# Patient Record
Sex: FEMALE | Race: WHITE | Employment: OTHER | URBAN - METROPOLITAN AREA
[De-identification: names, ages, dates, MRNs, and addresses within clinical notes are randomized per-mention and may not be internally consistent; named-entity substitution may affect disease eponyms.]

---

## 2017-04-17 ENCOUNTER — OFFICE VISIT (OUTPATIENT)
Dept: DERMATOLOGY CLINIC | Facility: CLINIC | Age: 62
End: 2017-04-17

## 2017-04-17 DIAGNOSIS — L71.9 ROSACEA: Primary | ICD-10-CM

## 2017-04-17 DIAGNOSIS — L82.1 SEBORRHEIC KERATOSES: ICD-10-CM

## 2017-04-17 DIAGNOSIS — D23.4 BENIGN NEOPLASM OF SCALP AND SKIN OF NECK: ICD-10-CM

## 2017-04-17 DIAGNOSIS — D23.30 BENIGN NEOPLASM OF SKIN OF FACE: ICD-10-CM

## 2017-04-17 DIAGNOSIS — D23.70 BENIGN NEOPLASM OF SKIN OF LOWER LIMB, INCLUDING HIP, UNSPECIFIED LATERALITY: ICD-10-CM

## 2017-04-17 DIAGNOSIS — D23.60 BENIGN NEOPLASM OF SKIN OF UPPER LIMB, INCLUDING SHOULDER, UNSPECIFIED LATERALITY: ICD-10-CM

## 2017-04-17 DIAGNOSIS — D23.5 BENIGN NEOPLASM OF SKIN OF TRUNK, EXCEPT SCROTUM: ICD-10-CM

## 2017-04-17 PROCEDURE — 99212 OFFICE O/P EST SF 10 MIN: CPT | Performed by: DERMATOLOGY

## 2017-04-17 PROCEDURE — 99213 OFFICE O/P EST LOW 20 MIN: CPT | Performed by: DERMATOLOGY

## 2017-04-17 RX ORDER — DOXYCYCLINE HYCLATE 100 MG
100 TABLET ORAL DAILY
Qty: 90 TABLET | Refills: 3 | Status: SHIPPED | OUTPATIENT
Start: 2017-04-17 | End: 2020-09-02 | Stop reason: ALTCHOICE

## 2017-04-17 RX ORDER — DOXYCYCLINE HYCLATE 100 MG
100 TABLET ORAL DAILY
Qty: 90 TABLET | Refills: 3 | Status: SHIPPED | OUTPATIENT
Start: 2017-04-17 | End: 2017-04-17

## 2017-04-17 NOTE — PROGRESS NOTES
Rx for Doxycycline 100mg faxed with conformation to [facility]) Drugs per pt request.  Fax #: [phone]   #: [id number]

## 2017-05-01 NOTE — PROGRESS NOTES
Jeremy Díaz is a 64year old female. HPI:     CC:  Patient presents with:  Full Skin Exam: LOV 10/26/2015. Pt presenting for full body skin exam. Pt denies personal and family hx of skin cancer. Rosacea: Pt presenting for f/u with rosacea.  Currently (100MG)  by oral route daily Disp: 90 tablet Rfl: 3   Calcium Carbonate-Vitamin D (CALCIUM 600 + D OR) Take 2 tablets by mouth daily. Disp:  Rfl:    Risedronate Sodium (ACTONEL) 150 MG Oral Tab Take 1 tablet (150 mg total) by mouth every 30 (thirty) days. Smoking status: Never Smoker     Smokeless tobacco: Never Used    Alcohol Use: Yes  0.0 oz/week    0 Standard drinks or equivalent per week         Comment: 3-4 occasionally    Drug Use: No    Sexual Activity: No   No  Other Topics Concern    History of ta pigmented lesions examined with dermoscopy benign-appearing patterns. Waxy tannish keratotic papules scattered, cherry-red vascular papules scattered. See map today's date for lesions noted . Otherwise remarkable for lesions as noted on map.   Kaitlynn Murphy

## 2017-05-09 ENCOUNTER — TELEPHONE (OUTPATIENT)
Dept: OBGYN CLINIC | Facility: CLINIC | Age: 62
End: 2017-05-09

## 2017-05-09 NOTE — TELEPHONE ENCOUNTER
Has annual exam for 6-19 w MAF needs refill  Please fax to pharmacy FAX : 1 [phone] Liliya Gómez     Please call pt once faxed over / lv vm on pts vm   Current outpatient prescriptions:     •  Risedronate Sodium (ACTONEL) 150 MG Oral T

## 2017-05-11 NOTE — TELEPHONE ENCOUNTER
Refill request received from [facility] ([facility] via fax. Will await MAFs response to message below before faxing form back. Form placed back in black bin until MAF responds.

## 2017-05-11 NOTE — TELEPHONE ENCOUNTER
Form completed and faxed back to [facility] for 1 tablet with no refills. (Rx states pt takes 1 pill every 30 days).

## 2017-06-19 ENCOUNTER — OFFICE VISIT (OUTPATIENT)
Dept: OBGYN CLINIC | Facility: CLINIC | Age: 62
End: 2017-06-19

## 2017-06-19 VITALS
DIASTOLIC BLOOD PRESSURE: 70 MMHG | BODY MASS INDEX: 30.16 KG/M2 | HEIGHT: 63.25 IN | WEIGHT: 172.38 LBS | HEART RATE: 58 BPM | SYSTOLIC BLOOD PRESSURE: 102 MMHG

## 2017-06-19 DIAGNOSIS — Z12.31 ENCOUNTER FOR SCREENING MAMMOGRAM FOR MALIGNANT NEOPLASM OF BREAST: ICD-10-CM

## 2017-06-19 DIAGNOSIS — Z13.820 OSTEOPOROSIS SCREENING: Primary | ICD-10-CM

## 2017-06-19 PROCEDURE — 99396 PREV VISIT EST AGE 40-64: CPT | Performed by: CLINICAL NURSE SPECIALIST

## 2017-06-19 NOTE — PROGRESS NOTES
Angel Hardin is a 64year old female  No LMP recorded. Patient has had a hysterectomy. Patient presents with:  Gyn Exam: Annual exam and rx refill  Last annual exam was 16. Last pap was 5/11/15 and was normal, HPV negative.  Denies hx of abno Pt has a pacemaker No    Pt has a defibrillator No    Reaction to local anesthetic No    Caffeine Concern Yes    Comment: 2 cups coffee daily     Social History Narrative       FAMILY HISTORY:  Family History   Problem Relation Age of Onset   • Lipids M constipation  Genitourinary:  denies dysuria, incontinence, abnormal vaginal discharge, vaginal itching  Musculoskeletal:  denies back pain. Skin/Breast:  Denies any breast pain, lumps, or discharge.    Neurological:  denies headaches, extremity weakness o needs to continue on Actonel, will need paper Rx for 1 full year as she pays out of pocket for it and uses a [address] Call if any vaginal bleeding. Encouraged 1500 mg calcium w/ vit D. Encouraged weight bearing exercise.     Follow-up in o

## 2017-07-20 ENCOUNTER — HOSPITAL ENCOUNTER (OUTPATIENT)
Dept: BONE DENSITY | Facility: HOSPITAL | Age: 62
Discharge: HOME | End: 2017-07-20
Attending: CLINICAL NURSE SPECIALIST
Payer: COMMERCIAL

## 2017-07-20 DIAGNOSIS — Z13.820 OSTEOPOROSIS SCREENING: ICD-10-CM

## 2017-07-20 PROCEDURE — 77080 DXA BONE DENSITY AXIAL: CPT | Performed by: CLINICAL NURSE SPECIALIST

## 2017-07-27 ENCOUNTER — TELEPHONE (OUTPATIENT)
Dept: OBGYN CLINIC | Facility: CLINIC | Age: 62
End: 2017-07-27

## 2017-07-27 NOTE — TELEPHONE ENCOUNTER
----- Message from PHI Messer sent at 7/27/2017  4:30 PM CDT -----  Please let pt know bone density has improved since 2015, which is great. Id recommend she stay on Actonel and we will repeat the scan in about 3 years.     MAF

## 2017-07-27 NOTE — TELEPHONE ENCOUNTER
Pt notified of results and recs below. Pt states she has one dose left of Actonel that she will take next week and requesting rx refill. Pt informed a message will be forwarded to [facility] to ask for the refill.  Pt requesting that the rx is mailed to her home a

## 2017-07-28 RX ORDER — RISEDRONATE SODIUM 150 MG/1
150 TABLET, FILM COATED ORAL
Qty: 1 TABLET | Refills: 12 | Status: SHIPPED | OUTPATIENT
Start: 2017-07-28 | End: 2018-07-02

## 2017-07-28 NOTE — TELEPHONE ENCOUNTER
This is fine to do-please mail pt her Rx. I just saw her for her annual on 6/19, so she can have a years worth.        MAF

## 2017-09-07 ENCOUNTER — HOSPITAL ENCOUNTER (OUTPATIENT)
Dept: MAMMOGRAPHY | Facility: HOSPITAL | Age: 62
Discharge: HOME | End: 2017-09-07
Attending: CLINICAL NURSE SPECIALIST
Payer: COMMERCIAL

## 2017-09-07 DIAGNOSIS — Z12.31 ENCOUNTER FOR SCREENING MAMMOGRAM FOR MALIGNANT NEOPLASM OF BREAST: ICD-10-CM

## 2017-09-07 PROCEDURE — 77067 SCR MAMMO BI INCL CAD: CPT | Performed by: CLINICAL NURSE SPECIALIST

## 2018-07-02 ENCOUNTER — OFFICE VISIT (OUTPATIENT)
Dept: OBGYN CLINIC | Facility: CLINIC | Age: 63
End: 2018-07-02

## 2018-07-02 ENCOUNTER — TELEPHONE (OUTPATIENT)
Dept: OBGYN CLINIC | Facility: CLINIC | Age: 63
End: 2018-07-02

## 2018-07-02 ENCOUNTER — APPOINTMENT (OUTPATIENT)
Dept: LAB | Facility: HOSPITAL | Age: 63
End: 2018-07-02
Attending: CLINICAL NURSE SPECIALIST
Payer: COMMERCIAL

## 2018-07-02 VITALS
WEIGHT: 165.38 LBS | BODY MASS INDEX: 29.67 KG/M2 | HEIGHT: 62.75 IN | HEART RATE: 67 BPM | DIASTOLIC BLOOD PRESSURE: 69 MMHG | SYSTOLIC BLOOD PRESSURE: 103 MMHG

## 2018-07-02 DIAGNOSIS — R30.0 DYSURIA: ICD-10-CM

## 2018-07-02 DIAGNOSIS — N89.8 VAGINAL IRRITATION: ICD-10-CM

## 2018-07-02 DIAGNOSIS — Z12.31 ENCOUNTER FOR SCREENING MAMMOGRAM FOR MALIGNANT NEOPLASM OF BREAST: ICD-10-CM

## 2018-07-02 DIAGNOSIS — Z01.419 WELL WOMAN EXAM WITH ROUTINE GYNECOLOGICAL EXAM: Primary | ICD-10-CM

## 2018-07-02 LAB
BILIRUB UR QL: NEGATIVE
COLOR UR: YELLOW
GLUCOSE UR-MCNC: NEGATIVE MG/DL
HGB UR QL STRIP.AUTO: NEGATIVE
KETONES UR-MCNC: NEGATIVE MG/DL
NITRITE UR QL STRIP.AUTO: NEGATIVE
PH UR: 7 [PH] (ref 5–8)
PROT UR-MCNC: NEGATIVE MG/DL
RBC #/AREA URNS AUTO: 1 /HPF
SP GR UR STRIP: 1.02 (ref 1–1.03)
UROBILINOGEN UR STRIP-ACNC: <2
VIT C UR-MCNC: NEGATIVE MG/DL
WBC #/AREA URNS AUTO: 18 /HPF

## 2018-07-02 PROCEDURE — 87086 URINE CULTURE/COLONY COUNT: CPT

## 2018-07-02 PROCEDURE — 99396 PREV VISIT EST AGE 40-64: CPT | Performed by: CLINICAL NURSE SPECIALIST

## 2018-07-02 PROCEDURE — 87088 URINE BACTERIA CULTURE: CPT

## 2018-07-02 PROCEDURE — 81001 URINALYSIS AUTO W/SCOPE: CPT

## 2018-07-02 PROCEDURE — 87186 SC STD MICRODIL/AGAR DIL: CPT

## 2018-07-02 RX ORDER — CHLORAL HYDRATE 500 MG
1000 CAPSULE ORAL DAILY
COMMUNITY
End: 2018-08-20 | Stop reason: CLARIF

## 2018-07-02 RX ORDER — RISEDRONATE SODIUM 150 MG/1
150 TABLET, FILM COATED ORAL
Qty: 1 TABLET | Refills: 12 | Status: SHIPPED | OUTPATIENT
Start: 2018-07-02 | End: 2020-09-02 | Stop reason: ALTCHOICE

## 2018-07-02 RX ORDER — CIPROFLOXACIN 500 MG/1
TABLET, FILM COATED ORAL
COMMUNITY
Start: 2018-04-26 | End: 2018-07-02

## 2018-07-02 RX ORDER — NITROFURANTOIN 25; 75 MG/1; MG/1
100 CAPSULE ORAL 2 TIMES DAILY
Qty: 14 CAPSULE | Refills: 0 | Status: SHIPPED | OUTPATIENT
Start: 2018-07-02 | End: 2018-07-03

## 2018-07-02 RX ORDER — ATOVAQUONE AND PROGUANIL HYDROCHLORIDE 250; 100 MG/1; MG/1
1 TABLET, FILM COATED ORAL
COMMUNITY
Start: 2016-08-25 | End: 2018-07-02

## 2018-07-03 RX ORDER — NITROFURANTOIN 25; 75 MG/1; MG/1
100 CAPSULE ORAL 2 TIMES DAILY
Qty: 14 CAPSULE | Refills: 0 | Status: SHIPPED | OUTPATIENT
Start: 2018-07-03 | End: 2018-07-10

## 2018-07-03 NOTE — TELEPHONE ENCOUNTER
----- Message from PHI Griffin sent at 7/2/2018  7:00 PM CDT -----  Please let pt know it does debi like she may have a UTI. Rx for macrobid 1 tab po BID x 7 days sent to pharmacy.      MAF

## 2018-07-03 NOTE — TELEPHONE ENCOUNTER
Pt informed of below. She asked that we sent rx to a different pharmacy. Pharmacy updated. Pt verbalized understanding.

## 2018-07-03 NOTE — PROGRESS NOTES
Alfonso Carmichael is a 58year old female  No LMP recorded. Patient has had a hysterectomy. Patient presents with:  Gyn Exam: Annual  Last annual exam was 17. Last pap was  and normal, HPV negative. No further pap's needed d/t hysterectomy.  D N/A     Occupational History  Dentist       Social History Main Topics   Smoking status: Never Smoker    Smokeless tobacco: Never Used    Alcohol use Yes  0.0 oz/week     Comment: social    Drug use: No    Sexual activity: No     Other Topics Concern    Hi Glycol-Propyl Glycol (SYSTANE) 0.4-0.3 % Ophthalmic Solution, Apply  to eye. Use one drop each eye PRN. , Disp: , Rfl:   •  vitamin E (E-400) 400 UNITS Oral Cap, Take  by mouth. vitamin E 400 unit capsule, Disp: , Rfl:     ALLERGIES:    Vicodin  [Hydrocodo* normal  Anus: no hemorroids     Assessment & Plan:  Dani Holt was seen today for gyn exam.    Diagnoses and all orders for this visit:    Well woman exam with routine gynecological exam    Vaginal irritation  -     GENITAL VAGINOSIS SCREEN; Future  -     GEN

## 2018-07-04 LAB
GENITAL VAGINOSIS SCREEN: NEGATIVE
TRICHOMONAS SCREEN: NEGATIVE

## 2018-07-10 ENCOUNTER — TELEPHONE (OUTPATIENT)
Dept: OBGYN CLINIC | Facility: CLINIC | Age: 63
End: 2018-07-10

## 2018-07-10 NOTE — TELEPHONE ENCOUNTER
----- Message from PHI Fried sent at 7/9/2018  8:12 AM CDT -----  Please let pt know vaginal culture is negative for infection.     MAF

## 2018-07-16 ENCOUNTER — PATIENT MESSAGE (OUTPATIENT)
Dept: OBGYN CLINIC | Facility: CLINIC | Age: 63
End: 2018-07-16

## 2018-07-16 DIAGNOSIS — R30.9 PAIN WITH URINATION: Primary | ICD-10-CM

## 2018-07-16 DIAGNOSIS — Z87.440 RECENT URINARY TRACT INFECTION: ICD-10-CM

## 2018-07-16 NOTE — TELEPHONE ENCOUNTER
From: Louis Brown  To: PHI Rodríguez  Sent: 7/16/2018 11:44 AM CDT  Subject: Visit Follow-up Question    Hi Vernell  I took antibiotics for UTI as directed w last dose 6 days ago.  My symptoms have improved but I am still having discomfort when ur

## 2018-07-19 ENCOUNTER — APPOINTMENT (OUTPATIENT)
Dept: LAB | Facility: HOSPITAL | Age: 63
End: 2018-07-19
Attending: CLINICAL NURSE SPECIALIST
Payer: COMMERCIAL

## 2018-07-19 DIAGNOSIS — R30.9 PAIN WITH URINATION: ICD-10-CM

## 2018-07-19 DIAGNOSIS — Z87.440 RECENT URINARY TRACT INFECTION: ICD-10-CM

## 2018-07-19 PROCEDURE — 87086 URINE CULTURE/COLONY COUNT: CPT

## 2018-07-23 ENCOUNTER — TELEPHONE (OUTPATIENT)
Dept: OBGYN CLINIC | Facility: CLINIC | Age: 63
End: 2018-07-23

## 2018-07-23 DIAGNOSIS — R30.0 BURNING WITH URINATION: Primary | ICD-10-CM

## 2018-07-23 NOTE — TELEPHONE ENCOUNTER
----- Message from PHI Fried sent at 7/22/2018  1:42 PM CDT -----  Please let pt know that urine culture shows some organisms that are c/w contamination. If she is still symptomatic, she should repeat the urine culture.     MAF

## 2018-07-23 NOTE — TELEPHONE ENCOUNTER
PT NOTIFIED OF RESULTS. SHE SAW THIS RESULT ON MY CHART ALREADY. STATES SHE IS STILL SYMPTOMATIC, SOME BURNING WITH URINATION STILL. PT AWARE ORDER FOR REPEAT URINE CULTURE PLACED AND WILL COME FOR THE TEST THIS WEEK.

## 2018-07-24 ENCOUNTER — LAB ENCOUNTER (OUTPATIENT)
Dept: LAB | Facility: HOSPITAL | Age: 63
End: 2018-07-24
Attending: CLINICAL NURSE SPECIALIST
Payer: COMMERCIAL

## 2018-07-24 DIAGNOSIS — R30.0 BURNING WITH URINATION: ICD-10-CM

## 2018-07-26 ENCOUNTER — APPOINTMENT (OUTPATIENT)
Dept: LAB | Facility: HOSPITAL | Age: 63
End: 2018-07-26
Attending: CLINICAL NURSE SPECIALIST
Payer: COMMERCIAL

## 2018-07-26 DIAGNOSIS — R30.0 BURNING WITH URINATION: ICD-10-CM

## 2018-07-26 LAB
BACTERIA UR QL AUTO: NEGATIVE /HPF
BILIRUB UR QL: NEGATIVE
CLARITY UR: CLEAR
COLOR UR: YELLOW
GLUCOSE UR-MCNC: NEGATIVE MG/DL
HGB UR QL STRIP.AUTO: NEGATIVE
KETONES UR-MCNC: NEGATIVE MG/DL
NITRITE UR QL STRIP.AUTO: NEGATIVE
PH UR: 7 [PH] (ref 5–8)
PROT UR-MCNC: NEGATIVE MG/DL
RBC #/AREA URNS AUTO: <1 /HPF
SP GR UR STRIP: 1.01 (ref 1–1.03)
UROBILINOGEN UR STRIP-ACNC: <2
VIT C UR-MCNC: NEGATIVE MG/DL
WBC #/AREA URNS AUTO: 1 /HPF

## 2018-07-26 PROCEDURE — 81001 URINALYSIS AUTO W/SCOPE: CPT

## 2018-07-30 ENCOUNTER — TELEPHONE (OUTPATIENT)
Dept: OBGYN CLINIC | Facility: CLINIC | Age: 63
End: 2018-07-30

## 2018-07-30 NOTE — TELEPHONE ENCOUNTER
----- Message from PHI Upton sent at 7/30/2018 12:46 PM CDT -----  Please let pt know UA looks ok. I had asked for a urine culture to be done but it looks like only a UA was ordered.  Id recommend that if she is still symptomatic, she follow up wit

## 2018-08-20 ENCOUNTER — LAB ENCOUNTER (OUTPATIENT)
Dept: LAB | Facility: HOSPITAL | Age: 63
End: 2018-08-20
Attending: INTERNAL MEDICINE
Payer: COMMERCIAL

## 2018-08-20 ENCOUNTER — OFFICE VISIT (OUTPATIENT)
Dept: INTERNAL MEDICINE CLINIC | Facility: CLINIC | Age: 63
End: 2018-08-20
Payer: COMMERCIAL

## 2018-08-20 VITALS
SYSTOLIC BLOOD PRESSURE: 110 MMHG | HEART RATE: 54 BPM | BODY MASS INDEX: 28.05 KG/M2 | HEIGHT: 63.5 IN | RESPIRATION RATE: 18 BRPM | WEIGHT: 160.31 LBS | DIASTOLIC BLOOD PRESSURE: 75 MMHG

## 2018-08-20 DIAGNOSIS — M85.80 OSTEOPENIA, UNSPECIFIED LOCATION: ICD-10-CM

## 2018-08-20 DIAGNOSIS — R30.9 PAINFUL URINATION: ICD-10-CM

## 2018-08-20 DIAGNOSIS — Z00.00 ROUTINE PHYSICAL EXAMINATION: Primary | ICD-10-CM

## 2018-08-20 DIAGNOSIS — Z00.00 ROUTINE PHYSICAL EXAMINATION: ICD-10-CM

## 2018-08-20 DIAGNOSIS — Z23 NEED FOR VACCINATION: ICD-10-CM

## 2018-08-20 LAB
ALBUMIN SERPL BCP-MCNC: 4.1 G/DL (ref 3.5–4.8)
ALBUMIN/GLOB SERPL: 1.5 {RATIO} (ref 1–2)
ALP SERPL-CCNC: 58 U/L (ref 32–100)
ALT SERPL-CCNC: 26 U/L (ref 14–54)
ANION GAP SERPL CALC-SCNC: 5 MMOL/L (ref 0–18)
APPEARANCE: CLEAR
AST SERPL-CCNC: 29 U/L (ref 15–41)
BASOPHILS # BLD: 0.1 K/UL (ref 0–0.2)
BASOPHILS NFR BLD: 2 %
BILIRUB SERPL-MCNC: 0.8 MG/DL (ref 0.3–1.2)
BILIRUBIN: NEGATIVE
BUN SERPL-MCNC: 12 MG/DL (ref 8–20)
BUN/CREAT SERPL: 15 (ref 10–20)
CALCIUM SERPL-MCNC: 9.4 MG/DL (ref 8.5–10.5)
CHLORIDE SERPL-SCNC: 105 MMOL/L (ref 95–110)
CHOLEST SERPL-MCNC: 199 MG/DL (ref 110–200)
CO2 SERPL-SCNC: 28 MMOL/L (ref 22–32)
CREAT SERPL-MCNC: 0.8 MG/DL (ref 0.5–1.5)
EOSINOPHIL # BLD: 0.1 K/UL (ref 0–0.7)
EOSINOPHIL NFR BLD: 2 %
ERYTHROCYTE [DISTWIDTH] IN BLOOD BY AUTOMATED COUNT: 13.1 % (ref 11–15)
GLOBULIN PLAS-MCNC: 2.7 G/DL (ref 2.5–3.7)
GLUCOSE (URINE DIPSTICK): NEGATIVE MG/DL
GLUCOSE SERPL-MCNC: 98 MG/DL (ref 70–99)
HCT VFR BLD AUTO: 42.4 % (ref 35–48)
HDLC SERPL-MCNC: 58 MG/DL
HGB BLD-MCNC: 14.3 G/DL (ref 12–16)
KETONES (URINE DIPSTICK): NEGATIVE MG/DL
LDLC SERPL CALC-MCNC: 121 MG/DL (ref 0–99)
LYMPHOCYTES # BLD: 0.9 K/UL (ref 1–4)
LYMPHOCYTES NFR BLD: 23 %
MCH RBC QN AUTO: 34.5 PG (ref 27–32)
MCHC RBC AUTO-ENTMCNC: 33.8 G/DL (ref 32–37)
MCV RBC AUTO: 102 FL (ref 80–100)
MONOCYTES # BLD: 0.3 K/UL (ref 0–1)
MONOCYTES NFR BLD: 8 %
MULTISTIX LOT#: ABNORMAL NUMERIC
NEUTROPHILS # BLD AUTO: 2.7 K/UL (ref 1.8–7.7)
NEUTROPHILS NFR BLD: 66 %
NITRITE, URINE: NEGATIVE
NONHDLC SERPL-MCNC: 141 MG/DL
OCCULT BLOOD: NEGATIVE
OSMOLALITY UR CALC.SUM OF ELEC: 286 MOSM/KG (ref 275–295)
PATIENT FASTING: YES
PH, URINE: 8 (ref 4.5–8)
PLATELET # BLD AUTO: 232 K/UL (ref 140–400)
PMV BLD AUTO: 8.9 FL (ref 7.4–10.3)
POTASSIUM SERPL-SCNC: 4.7 MMOL/L (ref 3.3–5.1)
PROT SERPL-MCNC: 6.8 G/DL (ref 5.9–8.4)
PROTEIN (URINE DIPSTICK): NEGATIVE MG/DL
RBC # BLD AUTO: 4.16 M/UL (ref 3.7–5.4)
SODIUM SERPL-SCNC: 138 MMOL/L (ref 136–144)
SPECIFIC GRAVITY: 1.01 (ref 1–1.03)
TRIGL SERPL-MCNC: 98 MG/DL (ref 1–149)
TSH SERPL-ACNC: 1.68 UIU/ML (ref 0.45–5.33)
URINE-COLOR: YELLOW
UROBILINOGEN,SEMI-QN: 0.2 MG/DL (ref 0–1.9)
VIT B12 SERPL-MCNC: 337 PG/ML (ref 181–914)
WBC # BLD AUTO: 4.2 K/UL (ref 4–11)

## 2018-08-20 PROCEDURE — 90715 TDAP VACCINE 7 YRS/> IM: CPT | Performed by: INTERNAL MEDICINE

## 2018-08-20 PROCEDURE — 99396 PREV VISIT EST AGE 40-64: CPT | Performed by: INTERNAL MEDICINE

## 2018-08-20 PROCEDURE — 80053 COMPREHEN METABOLIC PANEL: CPT

## 2018-08-20 PROCEDURE — 82607 VITAMIN B-12: CPT

## 2018-08-20 PROCEDURE — 80061 LIPID PANEL: CPT

## 2018-08-20 PROCEDURE — 84443 ASSAY THYROID STIM HORMONE: CPT

## 2018-08-20 PROCEDURE — 81002 URINALYSIS NONAUTO W/O SCOPE: CPT | Performed by: INTERNAL MEDICINE

## 2018-08-20 PROCEDURE — 90471 IMMUNIZATION ADMIN: CPT | Performed by: INTERNAL MEDICINE

## 2018-08-20 PROCEDURE — 36415 COLL VENOUS BLD VENIPUNCTURE: CPT

## 2018-08-20 PROCEDURE — 85025 COMPLETE CBC W/AUTO DIFF WBC: CPT

## 2018-08-27 ENCOUNTER — OFFICE VISIT (OUTPATIENT)
Dept: OPTOMETRY | Facility: CLINIC | Age: 63
End: 2018-08-27
Payer: COMMERCIAL

## 2018-08-27 DIAGNOSIS — H52.13 MYOPIA, BILATERAL: ICD-10-CM

## 2018-08-27 DIAGNOSIS — H25.13 AGE-RELATED NUCLEAR CATARACT OF BOTH EYES: Primary | ICD-10-CM

## 2018-08-27 PROCEDURE — 92012 INTRM OPH EXAM EST PATIENT: CPT | Performed by: OPTOMETRIST

## 2018-08-27 PROCEDURE — 92015 DETERMINE REFRACTIVE STATE: CPT | Performed by: OPTOMETRIST

## 2018-08-27 NOTE — PATIENT INSTRUCTIONS
Age-related nuclear cataract of both eyes  No treatment is required. Will continue to observe. Myopia, bilateral  New glasses RX given to update as needed. Patient wants to get a new pair of glasses to wear with her dental loops.

## 2018-08-27 NOTE — ASSESSMENT & PLAN NOTE
New glasses RX given to update as needed. Patient wants to get a new pair of glasses to wear with her dental loops.

## 2018-08-27 NOTE — PROGRESS NOTES
Fiorella Mendiola is a 58year old female. HPI:     HPI     Patient is in for an annual eye exam. She has no complaints  with her vision and saw her ophthalmologist --Rashard Clancy -- last year. She had a RCE in the past but no symptoms lately.   (Patient i social      Medications:    Current Outpatient Prescriptions:  Risedronate Sodium 150 MG Oral Tab Take 1 tablet (150 mg total) by mouth every 30 (thirty) days.  Disp: 1 tablet Rfl: 12   Doxycycline Hyclate 100 MG Oral Tab Take 1 tablet (100 mg total) by tran Lids/Lashes Normal punctal plug on LLL    Conjunctiva/Sclera Nasal/temp pinguecula Nasal/temp pinguecula    Cornea Clear Clear    Anterior Chamber Deep and quiet Deep and quiet    Iris Normal Normal    Lens Trace Nuclear sclerosis Trace Nuclear sclerosis

## 2018-09-20 ENCOUNTER — OFFICE VISIT (OUTPATIENT)
Dept: SURGERY | Facility: CLINIC | Age: 63
End: 2018-09-20
Payer: COMMERCIAL

## 2018-09-20 VITALS
HEIGHT: 63 IN | DIASTOLIC BLOOD PRESSURE: 80 MMHG | WEIGHT: 160 LBS | SYSTOLIC BLOOD PRESSURE: 110 MMHG | BODY MASS INDEX: 28.35 KG/M2 | TEMPERATURE: 98 F | RESPIRATION RATE: 16 BRPM | HEART RATE: 55 BPM

## 2018-09-20 DIAGNOSIS — R30.0 DYSURIA: Primary | ICD-10-CM

## 2018-09-20 PROCEDURE — 99212 OFFICE O/P EST SF 10 MIN: CPT | Performed by: UROLOGY

## 2018-09-20 PROCEDURE — 99204 OFFICE O/P NEW MOD 45 MIN: CPT | Performed by: UROLOGY

## 2018-09-20 NOTE — PROGRESS NOTES
SUBJECTIVE:  Randal Cook is a 58year old female who presents for a consultation at the request of, and a copy of this note will be sent to, Dr. Moo Li, for evaluation of  dysuria. She states that the problem is unchanged.  Symptoms include had been on CHOLECYSTECTOMY  11/2011: COLONOSCOPY  11/14/2011: COLONOSCOPY      Comment:  melvi [facility]  2012: HAND/FINGER SURGERY UNLISTED      Comment:  surgery (ruptured ligament in right thumb)  2000: HYSTERECTOMY  No date: GAYLE BIOPSY STEREOTACTIC NODULE 2 SITE BILAT cooperative.   CARDIOVASCULAR:normal apical impulse  RESPIRATORY: no chest wall deformities or tenderness  ABDOMEN: abdomen is soft without significant tenderness, masses, organomegaly or guarding  Reviewed pelvic exam from [facility] 7/2/2018 showing no

## 2018-10-11 ENCOUNTER — HOSPITAL ENCOUNTER (OUTPATIENT)
Dept: MAMMOGRAPHY | Age: 63
Discharge: HOME | End: 2018-10-11
Attending: CLINICAL NURSE SPECIALIST
Payer: COMMERCIAL

## 2018-10-11 DIAGNOSIS — Z12.31 ENCOUNTER FOR SCREENING MAMMOGRAM FOR MALIGNANT NEOPLASM OF BREAST: ICD-10-CM

## 2018-10-11 PROCEDURE — 77067 SCR MAMMO BI INCL CAD: CPT | Performed by: CLINICAL NURSE SPECIALIST

## 2018-10-11 PROCEDURE — 77063 BREAST TOMOSYNTHESIS BI: CPT | Performed by: CLINICAL NURSE SPECIALIST

## 2018-10-15 ENCOUNTER — HOSPITAL ENCOUNTER (OUTPATIENT)
Dept: MAMMOGRAPHY | Facility: HOSPITAL | Age: 63
Discharge: HOME | End: 2018-10-15
Attending: CLINICAL NURSE SPECIALIST
Payer: COMMERCIAL

## 2018-10-15 DIAGNOSIS — R92.8 ABNORMAL MAMMOGRAM: ICD-10-CM

## 2018-10-15 PROCEDURE — 77061 BREAST TOMOSYNTHESIS UNI: CPT | Performed by: CLINICAL NURSE SPECIALIST

## 2018-10-15 PROCEDURE — 77065 DX MAMMO INCL CAD UNI: CPT | Performed by: CLINICAL NURSE SPECIALIST

## 2019-09-12 ENCOUNTER — OFFICE VISIT (OUTPATIENT)
Dept: OBGYN CLINIC | Facility: CLINIC | Age: 64
End: 2019-09-12
Payer: COMMERCIAL

## 2019-09-12 VITALS
DIASTOLIC BLOOD PRESSURE: 72 MMHG | HEIGHT: 63 IN | WEIGHT: 178.81 LBS | HEART RATE: 65 BPM | BODY MASS INDEX: 31.68 KG/M2 | SYSTOLIC BLOOD PRESSURE: 105 MMHG

## 2019-09-12 DIAGNOSIS — M81.8 OTHER OSTEOPOROSIS WITHOUT CURRENT PATHOLOGICAL FRACTURE: ICD-10-CM

## 2019-09-12 DIAGNOSIS — Z12.31 ENCOUNTER FOR SCREENING MAMMOGRAM FOR MALIGNANT NEOPLASM OF BREAST: ICD-10-CM

## 2019-09-12 DIAGNOSIS — Z01.419 WELL WOMAN EXAM WITH ROUTINE GYNECOLOGICAL EXAM: Primary | ICD-10-CM

## 2019-09-12 PROCEDURE — 99396 PREV VISIT EST AGE 40-64: CPT | Performed by: CLINICAL NURSE SPECIALIST

## 2019-09-12 RX ORDER — ATOVAQUONE AND PROGUANIL HYDROCHLORIDE 250; 100 MG/1; MG/1
1 TABLET, FILM COATED ORAL
COMMUNITY
Start: 2016-08-25 | End: 2020-09-02 | Stop reason: ALTCHOICE

## 2019-09-12 NOTE — PROGRESS NOTES
Hannah Nowak is a 61year old female  No LMP recorded. Patient has had a hysterectomy. Patient presents with:  Gyn Exam: annual exam, mammo order [facility]. [facility] [phone] fax number)  Last annual exam was last year.  Last pap was 20 Number of children: Not on file      Years of education: Not on file      Highest education level: Not on file    Occupational History      Occupation: Dentist    Social Needs      Financial resource strain: Not on file      Food insecurity:        Worry Back Care: Not Asked        Exercise: Not Asked        Bike Helmet: Not Asked        Seat Belt: Not Asked        Self-Exams: Not Asked    Social History Narrative      Not on file      FAMILY HISTORY:  Family History   Problem Relation Age of Onset abdominal pain, diarrhea or constipation  Genitourinary:  denies dysuria, incontinence, abnormal vaginal discharge, vaginal itching  Musculoskeletal:  denies back pain. Skin/Breast:  Denies any breast pain, lumps, or discharge.    Neurological:  denies hea encouraged. Mammogram ordered. Call if any vaginal bleeding. Encouraged 1200 mg calcium w/ vit D. Continue Taking Actonel   Bone density ordered  Encouraged weight bearing exercise. Follow-up in one year.

## 2019-10-24 ENCOUNTER — OFFICE VISIT (OUTPATIENT)
Dept: DERMATOLOGY CLINIC | Facility: CLINIC | Age: 64
End: 2019-10-24
Payer: COMMERCIAL

## 2019-10-24 DIAGNOSIS — D23.5 BENIGN NEOPLASM OF SKIN OF TRUNK, EXCEPT SCROTUM: ICD-10-CM

## 2019-10-24 DIAGNOSIS — D23.30 BENIGN NEOPLASM OF SKIN OF FACE: ICD-10-CM

## 2019-10-24 DIAGNOSIS — D23.4 BENIGN NEOPLASM OF SCALP AND SKIN OF NECK: ICD-10-CM

## 2019-10-24 DIAGNOSIS — L82.1 SEBORRHEIC KERATOSES: ICD-10-CM

## 2019-10-24 DIAGNOSIS — L57.0 AK (ACTINIC KERATOSIS): Primary | ICD-10-CM

## 2019-10-24 DIAGNOSIS — D23.60 BENIGN NEOPLASM OF SKIN OF UPPER LIMB, INCLUDING SHOULDER, UNSPECIFIED LATERALITY: ICD-10-CM

## 2019-10-24 DIAGNOSIS — L71.9 ROSACEA: ICD-10-CM

## 2019-10-24 PROCEDURE — 99213 OFFICE O/P EST LOW 20 MIN: CPT | Performed by: DERMATOLOGY

## 2019-10-24 PROCEDURE — 17000 DESTRUCT PREMALG LESION: CPT | Performed by: DERMATOLOGY

## 2019-10-24 RX ORDER — DOXYCYCLINE HYCLATE 100 MG/1
CAPSULE ORAL
Qty: 90 CAPSULE | Refills: 3 | Status: SHIPPED | OUTPATIENT
Start: 2019-10-24 | End: 2019-10-24

## 2019-10-24 RX ORDER — DOXYCYCLINE HYCLATE 100 MG/1
CAPSULE ORAL
Qty: 90 CAPSULE | Refills: 3 | Status: SHIPPED | OUTPATIENT
Start: 2019-10-24

## 2019-10-24 RX ORDER — NEOMYCIN SULFATE, POLYMYXIN B SULFATE AND DEXAMETHASONE 3.5; 10000; 1 MG/ML; [USP'U]/ML; MG/ML
SUSPENSION/ DROPS OPHTHALMIC
Refills: 0 | COMMUNITY
Start: 2019-10-17 | End: 2020-09-02 | Stop reason: ALTCHOICE

## 2019-10-24 RX ORDER — AMOXICILLIN 500 MG/1
CAPSULE ORAL
Refills: 0 | COMMUNITY
Start: 2019-09-26 | End: 2020-09-02 | Stop reason: ALTCHOICE

## 2019-10-24 NOTE — PROGRESS NOTES
Pharmacy   [phone] fax--  [facility]. [facility] pharmacy

## 2019-10-31 ENCOUNTER — HOSPITAL ENCOUNTER (OUTPATIENT)
Dept: MAMMOGRAPHY | Facility: HOSPITAL | Age: 64
Discharge: HOME | End: 2019-10-31
Attending: CLINICAL NURSE SPECIALIST
Payer: COMMERCIAL

## 2019-10-31 ENCOUNTER — HOSPITAL ENCOUNTER (OUTPATIENT)
Dept: BONE DENSITY | Facility: HOSPITAL | Age: 64
Discharge: HOME | End: 2019-10-31
Attending: CLINICAL NURSE SPECIALIST
Payer: COMMERCIAL

## 2019-10-31 DIAGNOSIS — Z12.31 ENCOUNTER FOR SCREENING MAMMOGRAM FOR MALIGNANT NEOPLASM OF BREAST: ICD-10-CM

## 2019-10-31 DIAGNOSIS — M81.8 OTHER OSTEOPOROSIS WITHOUT CURRENT PATHOLOGICAL FRACTURE: ICD-10-CM

## 2019-10-31 PROCEDURE — 77063 BREAST TOMOSYNTHESIS BI: CPT | Performed by: CLINICAL NURSE SPECIALIST

## 2019-10-31 PROCEDURE — 77067 SCR MAMMO BI INCL CAD: CPT | Performed by: CLINICAL NURSE SPECIALIST

## 2019-10-31 PROCEDURE — 77080 DXA BONE DENSITY AXIAL: CPT | Performed by: CLINICAL NURSE SPECIALIST

## 2019-11-03 NOTE — PROGRESS NOTES
Flor Arrieta is a 61year old female. HPI:     CC:  Patient presents with:  Full Skin Exam: LOV 4/17/2017  Pt presenting for full body skin exam.  Pt denies personal hx of sc. Pt denies family hx of sc.         Allergies:  Vicodin  [Hydrocodone-Acetami Comment: social    Drug use: No       amoxicillin 500 MG Oral Cap, TAKE 1 CAPSULE BY MOUTH EVERY 8 HOURS UNTIL GONE FOR DENTAL INFECTIONS, Disp: , Rfl: 0  Neomycin-Polymyxin-Dexameth 3.5-10975-5.1 Ophthalmic Suspension, INSTILL 1 DROP INTO AFFECTED EYE 3 Laterality Date   • BREAST BIOPSY Left     benign   • BREAST BIOPSY Right     2002   • CHOLECYSTECTOMY  2004   • COLONOSCOPY  11/2011   • COLONOSCOPY  11/14/2011    per NG   • HAND/FINGER SURGERY UNLISTED  2012    surgery (ruptured ligament in right thumb) on file        Physically abused: Not on file        Forced sexual activity: Not on file    Other Topics      Concerns:        Grew up on a farm: Not Asked        History of tanning: No        Outdoor occupation: Not Asked        Pt has a pacemaker: No medications, allergies reviewed as noted. ROS:  Denies any other systemic complaints. No new or changeing lesions other than noted above. No fevers, chills, night sweats, unusual sun sensitivity. No other skin complaints.         History, medications erythema papules. Continue doxy as needed. For inflammatory flares patient takes this every other month has had flareup of ocular rosacea her ophthalmologist had suggested doxy will take this more consistently for the next few weeks. .Rosacea.   Meds in

## 2019-11-05 ENCOUNTER — MED REC SCAN ONLY (OUTPATIENT)
Dept: DERMATOLOGY CLINIC | Facility: CLINIC | Age: 64
End: 2019-11-05

## 2019-11-23 ENCOUNTER — TELEPHONE (OUTPATIENT)
Dept: OBGYN CLINIC | Facility: CLINIC | Age: 64
End: 2019-11-23

## 2019-11-23 NOTE — TELEPHONE ENCOUNTER
----- Message from CLIFTON Yuan sent at 11/22/2019  2:09 PM CST -----  Please let pt know her bone density is stable.  There has been a slight improvement in the area of the spine but slight decrease in the are of the hip but not terribly significant

## 2020-09-02 ENCOUNTER — OFFICE VISIT (OUTPATIENT)
Dept: INTERNAL MEDICINE CLINIC | Facility: CLINIC | Age: 65
End: 2020-09-02
Payer: COMMERCIAL

## 2020-09-02 VITALS
BODY MASS INDEX: 29.88 KG/M2 | WEIGHT: 168.63 LBS | DIASTOLIC BLOOD PRESSURE: 75 MMHG | SYSTOLIC BLOOD PRESSURE: 110 MMHG | HEART RATE: 61 BPM | HEIGHT: 63 IN

## 2020-09-02 DIAGNOSIS — Z00.00 ROUTINE PHYSICAL EXAMINATION: Primary | ICD-10-CM

## 2020-09-02 PROCEDURE — 3078F DIAST BP <80 MM HG: CPT | Performed by: INTERNAL MEDICINE

## 2020-09-02 PROCEDURE — 99396 PREV VISIT EST AGE 40-64: CPT | Performed by: INTERNAL MEDICINE

## 2020-09-02 PROCEDURE — 3074F SYST BP LT 130 MM HG: CPT | Performed by: INTERNAL MEDICINE

## 2020-09-02 PROCEDURE — 3008F BODY MASS INDEX DOCD: CPT | Performed by: INTERNAL MEDICINE

## 2020-09-02 NOTE — PROGRESS NOTES
HPI:    Patient ID: Briseida Patton is a 59year old female. HPI  Patient is here requesting general physical exam and follow-up on chronic medical issues as listed below. Mainly just has the osteopenia. I last saw her in August 2018 for general physica • GAYLE BIOPSY STEREO NODULE 2 SITE BILAT (CPT=19081/93159)      2003   • OTHER SURGICAL HISTORY  10/2004    \"GALLBLADDER SURGERY\"   • TOTAL ABDOM HYSTERECTOMY  9/2000    TOBIAS/BSO      Family History   Problem Relation Age of Onset   • Lipids Mother (MULTIVITAMIN OR) Take  by mouth. Take one tablet by mouth daily     • Polyethyl Glycol-Propyl Glycol (SYSTANE) 0.4-0.3 % Ophthalmic Solution Apply  to eye. Use one drop each eye PRN.      • vitamin E (E-400) 400 UNITS Oral Cap Take  by mouth. vitamin E 400 regular exercise, achieving healthy body weight. We will check fasting blood work and contact patient with results. Health maintenance issues reviewed. No prescription medications at this time.   - CBC WITH DIFFERENTIAL WITH PLATELET  - COMP METABOLIC PA

## 2020-09-10 ENCOUNTER — APPOINTMENT (OUTPATIENT)
Dept: LAB | Facility: HOSPITAL | Age: 65
End: 2020-09-10
Attending: INTERNAL MEDICINE
Payer: COMMERCIAL

## 2020-09-10 LAB
ALBUMIN SERPL-MCNC: 3.7 G/DL (ref 3.4–5)
ALBUMIN/GLOB SERPL: 1.1 {RATIO} (ref 1–2)
ALP LIVER SERPL-CCNC: 100 U/L (ref 50–130)
ALT SERPL-CCNC: 27 U/L (ref 13–56)
ANION GAP SERPL CALC-SCNC: 4 MMOL/L (ref 0–18)
AST SERPL-CCNC: 24 U/L (ref 15–37)
BASOPHILS # BLD AUTO: 0.1 X10(3) UL (ref 0–0.2)
BASOPHILS NFR BLD AUTO: 2.4 %
BILIRUB SERPL-MCNC: 0.3 MG/DL (ref 0.1–2)
BUN BLD-MCNC: 15 MG/DL (ref 7–18)
BUN/CREAT SERPL: 16.7 (ref 10–20)
CALCIUM BLD-MCNC: 9.2 MG/DL (ref 8.5–10.1)
CHLORIDE SERPL-SCNC: 109 MMOL/L (ref 98–112)
CHOLEST SMN-MCNC: 222 MG/DL (ref ?–200)
CO2 SERPL-SCNC: 26 MMOL/L (ref 21–32)
CREAT BLD-MCNC: 0.9 MG/DL (ref 0.55–1.02)
DEPRECATED RDW RBC AUTO: 46.7 FL (ref 35.1–46.3)
EOSINOPHIL # BLD AUTO: 0.21 X10(3) UL (ref 0–0.7)
EOSINOPHIL NFR BLD AUTO: 5 %
ERYTHROCYTE [DISTWIDTH] IN BLOOD BY AUTOMATED COUNT: 13.6 % (ref 11–15)
GLOBULIN PLAS-MCNC: 3.5 G/DL (ref 2.8–4.4)
GLUCOSE BLD-MCNC: 92 MG/DL (ref 70–99)
HCT VFR BLD AUTO: 40.2 % (ref 35–48)
HDLC SERPL-MCNC: 57 MG/DL (ref 40–59)
HGB BLD-MCNC: 13.2 G/DL (ref 12–16)
IMM GRANULOCYTES # BLD AUTO: 0.01 X10(3) UL (ref 0–1)
IMM GRANULOCYTES NFR BLD: 0.2 %
LDLC SERPL CALC-MCNC: 135 MG/DL (ref ?–100)
LYMPHOCYTES # BLD AUTO: 1.19 X10(3) UL (ref 1–4)
LYMPHOCYTES NFR BLD AUTO: 28.5 %
M PROTEIN MFR SERPL ELPH: 7.2 G/DL (ref 6.4–8.2)
MCH RBC QN AUTO: 30.8 PG (ref 26–34)
MCHC RBC AUTO-ENTMCNC: 32.8 G/DL (ref 31–37)
MCV RBC AUTO: 93.9 FL (ref 80–100)
MONOCYTES # BLD AUTO: 0.36 X10(3) UL (ref 0.1–1)
MONOCYTES NFR BLD AUTO: 8.6 %
NEUTROPHILS # BLD AUTO: 2.3 X10 (3) UL (ref 1.5–7.7)
NEUTROPHILS # BLD AUTO: 2.3 X10(3) UL (ref 1.5–7.7)
NEUTROPHILS NFR BLD AUTO: 55.3 %
NONHDLC SERPL-MCNC: 165 MG/DL (ref ?–130)
OSMOLALITY SERPL CALC.SUM OF ELEC: 288 MOSM/KG (ref 275–295)
PATIENT FASTING Y/N/NP: YES
PATIENT FASTING Y/N/NP: YES
PLATELET # BLD AUTO: 246 10(3)UL (ref 150–450)
POTASSIUM SERPL-SCNC: 4.4 MMOL/L (ref 3.5–5.1)
RBC # BLD AUTO: 4.28 X10(6)UL (ref 3.8–5.3)
SODIUM SERPL-SCNC: 139 MMOL/L (ref 136–145)
TRIGL SERPL-MCNC: 149 MG/DL (ref 30–149)
TSI SER-ACNC: 2.81 MIU/ML (ref 0.36–3.74)
VIT B12 SERPL-MCNC: 435 PG/ML (ref 193–986)
VLDLC SERPL CALC-MCNC: 30 MG/DL (ref 0–30)
WBC # BLD AUTO: 4.2 X10(3) UL (ref 4–11)

## 2020-09-10 PROCEDURE — 84443 ASSAY THYROID STIM HORMONE: CPT | Performed by: INTERNAL MEDICINE

## 2020-09-10 PROCEDURE — 85025 COMPLETE CBC W/AUTO DIFF WBC: CPT | Performed by: INTERNAL MEDICINE

## 2020-09-10 PROCEDURE — 36415 COLL VENOUS BLD VENIPUNCTURE: CPT | Performed by: INTERNAL MEDICINE

## 2020-09-10 PROCEDURE — 80061 LIPID PANEL: CPT | Performed by: INTERNAL MEDICINE

## 2020-09-10 PROCEDURE — 82306 VITAMIN D 25 HYDROXY: CPT | Performed by: INTERNAL MEDICINE

## 2020-09-10 PROCEDURE — 80053 COMPREHEN METABOLIC PANEL: CPT | Performed by: INTERNAL MEDICINE

## 2020-09-10 PROCEDURE — 82607 VITAMIN B-12: CPT | Performed by: INTERNAL MEDICINE

## 2020-09-11 LAB — 25(OH)D3 SERPL-MCNC: 30.5 NG/ML (ref 30–100)

## 2020-10-07 ENCOUNTER — OFFICE VISIT (OUTPATIENT)
Dept: OBGYN CLINIC | Facility: CLINIC | Age: 65
End: 2020-10-07
Payer: COMMERCIAL

## 2020-10-07 VITALS
BODY MASS INDEX: 30 KG/M2 | HEART RATE: 64 BPM | SYSTOLIC BLOOD PRESSURE: 116 MMHG | DIASTOLIC BLOOD PRESSURE: 74 MMHG | WEIGHT: 170 LBS

## 2020-10-07 DIAGNOSIS — Z01.419 WELL WOMAN EXAM WITH ROUTINE GYNECOLOGICAL EXAM: Primary | ICD-10-CM

## 2020-10-07 DIAGNOSIS — Z12.31 ENCOUNTER FOR SCREENING MAMMOGRAM FOR MALIGNANT NEOPLASM OF BREAST: ICD-10-CM

## 2020-10-07 PROCEDURE — 99396 PREV VISIT EST AGE 40-64: CPT | Performed by: CLINICAL NURSE SPECIALIST

## 2020-10-07 PROCEDURE — 3074F SYST BP LT 130 MM HG: CPT | Performed by: CLINICAL NURSE SPECIALIST

## 2020-10-07 PROCEDURE — 3078F DIAST BP <80 MM HG: CPT | Performed by: CLINICAL NURSE SPECIALIST

## 2020-10-07 RX ORDER — MULTIVIT-MIN/IRON/FOLIC ACID/K 18-600-40
CAPSULE ORAL
COMMUNITY
Start: 2020-09-21

## 2020-10-07 NOTE — PROGRESS NOTES
Jina Jones is a 59year old female  No LMP recorded. Patient has had a hysterectomy. Patient presents with:  Gyn Exam: annual  Last annual exam was last year. Last pap was 2017 and normal, HPV negative.  Pap's no longer needed d/t hysterecto TOBIAS/DIANA       SOCIAL HISTORY:  Social History    Socioeconomic History      Marital status: Single      Spouse name: Not on file      Number of children: Not on file      Years of education: Not on file      Highest education level: Not on file    [address] [place] [address] Sleep Concern: Not Asked        Stress Concern: Not Asked        Weight Concern: Not Asked        Special Diet: Not Asked        Back Care: Not Asked        Exercise: Not Asked        Bike Helmet: Not Asked        Seat Belt: Not Asked        Self-Exams: dysuria, incontinence, abnormal vaginal discharge, vaginal itching  Musculoskeletal:  denies back pain. Skin/Breast:  Denies any breast pain, lumps, or discharge. Neurological:  denies headaches, extremity weakness or numbness.   Psychiatric: denies depr weight bearing exercise. Follow-up in one year.

## 2020-10-26 ENCOUNTER — OFFICE VISIT (OUTPATIENT)
Dept: DERMATOLOGY CLINIC | Facility: CLINIC | Age: 65
End: 2020-10-26
Payer: COMMERCIAL

## 2020-10-26 DIAGNOSIS — D23.60 BENIGN NEOPLASM OF SKIN OF UPPER LIMB, INCLUDING SHOULDER, UNSPECIFIED LATERALITY: ICD-10-CM

## 2020-10-26 DIAGNOSIS — L82.1 SEBORRHEIC KERATOSES: Primary | ICD-10-CM

## 2020-10-26 DIAGNOSIS — L57.0 AK (ACTINIC KERATOSIS): ICD-10-CM

## 2020-10-26 DIAGNOSIS — D23.5 BENIGN NEOPLASM OF SKIN OF TRUNK, EXCEPT SCROTUM: ICD-10-CM

## 2020-10-26 DIAGNOSIS — D23.4 BENIGN NEOPLASM OF SCALP AND SKIN OF NECK: ICD-10-CM

## 2020-10-26 DIAGNOSIS — D23.30 BENIGN NEOPLASM OF SKIN OF FACE: ICD-10-CM

## 2020-10-26 DIAGNOSIS — L71.9 ROSACEA: ICD-10-CM

## 2020-10-26 PROCEDURE — 99213 OFFICE O/P EST LOW 20 MIN: CPT | Performed by: DERMATOLOGY

## 2020-11-02 ENCOUNTER — HOSPITAL ENCOUNTER (OUTPATIENT)
Dept: MAMMOGRAPHY | Facility: HOSPITAL | Age: 65
Discharge: HOME | End: 2020-11-02
Attending: CLINICAL NURSE SPECIALIST
Payer: COMMERCIAL

## 2020-11-02 DIAGNOSIS — Z12.31 ENCOUNTER FOR SCREENING MAMMOGRAM FOR MALIGNANT NEOPLASM OF BREAST: ICD-10-CM

## 2020-11-02 PROCEDURE — 77067 SCR MAMMO BI INCL CAD: CPT | Performed by: CLINICAL NURSE SPECIALIST

## 2020-11-02 PROCEDURE — 77063 BREAST TOMOSYNTHESIS BI: CPT | Performed by: CLINICAL NURSE SPECIALIST

## 2020-11-02 NOTE — PROGRESS NOTES
Agusto Jackson is a 59year old female. HPI:     CC:  Patient presents with:  Full Skin Exam: LOV 10/2019. Pt requesting full skin exam. Concerned with irritated lesion on left shoulder. Denies personal and family hx of skin ca.    Other: Please fax a History    Tobacco Use      Smoking status: Never Smoker      Smokeless tobacco: Never Used    Alcohol use:  Yes      Alcohol/week: 0.0 standard drinks      Comment: social    Drug use: No       Current Outpatient Medications   Medication Sig Dispense Refil HISTORY  10/2004    \"GALLBLADDER SURGERY\"   • TOTAL ABDOM HYSTERECTOMY  9/2000    TOBIAS/BSO     Social History    Socioeconomic History      Marital status: Single      Spouse name: Not on file      Number of children: Not on file      Years of education: Occupational Exposure: Not Asked        Hobby Hazards: Not Asked        Sleep Concern: Not Asked        Stress Concern: Not Asked        Weight Concern: Not Asked        Special Diet: Not Asked        Back Care: Not Asked        Exercise: Not Asked no acute distress. Exam total-body performed, including scalp, head, neck, face,nails, hair, external eyes, including conjunctival mucosa, eyelids, lips external ears, back, chest,/ breasts, axillae,  abdomen, arms, legs, palms.      Multiple light to [facility] protection monitoring. Multiple benign keratoses extensive lentigines no significant changes no new suspicious lesions  Please refer to map for specific lesions. See additional diagnoses. Pros cons of various therapies, risks benefits discussed. Pathophy

## 2021-01-04 PROBLEM — M21.6X2 PRONATION DEFORMITY OF BOTH FEET: Status: ACTIVE | Noted: 2021-01-04

## 2021-01-04 PROBLEM — M21.6X1 PRONATION DEFORMITY OF BOTH FEET: Status: ACTIVE | Noted: 2021-01-04

## 2021-01-04 PROBLEM — M72.2 PLANTAR FASCIITIS, BILATERAL: Status: ACTIVE | Noted: 2021-01-04

## 2021-04-21 ENCOUNTER — LAB ENCOUNTER (OUTPATIENT)
Dept: LAB | Facility: HOSPITAL | Age: 66
End: 2021-04-21
Attending: INTERNAL MEDICINE
Payer: MEDICARE

## 2021-04-21 PROCEDURE — 36415 COLL VENOUS BLD VENIPUNCTURE: CPT | Performed by: INTERNAL MEDICINE

## 2021-04-21 PROCEDURE — 80061 LIPID PANEL: CPT | Performed by: INTERNAL MEDICINE

## 2021-07-16 ENCOUNTER — HOSPITAL ENCOUNTER (OUTPATIENT)
Dept: GENERAL RADIOLOGY | Facility: HOSPITAL | Age: 66
Discharge: HOME | End: 2021-07-16
Attending: PHYSICAL MEDICINE & REHABILITATION
Payer: MEDICARE

## 2021-07-16 ENCOUNTER — OFFICE VISIT (OUTPATIENT)
Dept: NEUROLOGY | Facility: CLINIC | Age: 66
End: 2021-07-16
Payer: MEDICARE

## 2021-07-16 VITALS — BODY MASS INDEX: 29.23 KG/M2 | OXYGEN SATURATION: 98 % | HEIGHT: 63 IN | WEIGHT: 165 LBS | HEART RATE: 68 BPM

## 2021-07-16 DIAGNOSIS — M17.12 PRIMARY OSTEOARTHRITIS OF LEFT KNEE: ICD-10-CM

## 2021-07-16 DIAGNOSIS — M17.12 PRIMARY OSTEOARTHRITIS OF LEFT KNEE: Primary | ICD-10-CM

## 2021-07-16 DIAGNOSIS — M79.89 LEG SWELLING: ICD-10-CM

## 2021-07-16 PROCEDURE — 99204 OFFICE O/P NEW MOD 45 MIN: CPT | Performed by: PHYSICAL MEDICINE & REHABILITATION

## 2021-07-16 PROCEDURE — 73562 X-RAY EXAM OF KNEE 3: CPT | Performed by: PHYSICAL MEDICINE & REHABILITATION

## 2021-07-16 RX ORDER — MELOXICAM 15 MG/1
15 TABLET ORAL DAILY
Qty: 30 TABLET | Refills: 0 | Status: SHIPPED | OUTPATIENT
Start: 2021-07-16 | End: 2021-11-15

## 2021-07-16 NOTE — PROGRESS NOTES
[address]  Progress Note    CHIEF COMPLAINT:  Patient presents with:  Knee Pain: New right handed pt comes in with left knee pain in back. Thinks may have injured during bike ride. . Walking makes it worse. Smoking status: Never Smoker      Smokeless tobacco: Never Used    Vaping Use      Vaping Use: Never used    Substance and Sexual Activity      Alcohol use:  Yes        Alcohol/week: 0.0 standard drinks        Comment: social      Drug use: No      Sexual a Skin  Open Sores: denies  Nodules or Lumps: denies  Rash: denies   Neurological  Loss of Strength Since last Visit: denies  Tingling/Numbness: admits  Balance: denies   Psychiatric  Anxiety: denies  Depressed Mood: denies             PHYSICAL EXAM:   Pul

## 2021-07-19 ENCOUNTER — TELEPHONE (OUTPATIENT)
Dept: NEUROLOGY | Facility: CLINIC | Age: 66
End: 2021-07-19

## 2021-07-19 ENCOUNTER — PATIENT MESSAGE (OUTPATIENT)
Dept: NEUROLOGY | Facility: CLINIC | Age: 66
End: 2021-07-19

## 2021-07-19 NOTE — TELEPHONE ENCOUNTER
Pt. Looking to see if authorization for ultrasound  Is approved. At her appt. Here she said Staff told her they would check her coverage and let her know. Please advise.

## 2021-07-20 NOTE — TELEPHONE ENCOUNTER
According per referrals pt can proceed, no authorization needed for Ultrasound Pt notified. Central Scheduling number provided .  Left vm

## 2021-07-20 NOTE — TELEPHONE ENCOUNTER
From: Ankur Bustos  To: Kelly Swift MD  Sent: 7/19/2021 8:23 PM CDT  Subject: Visit Follow-up Question    Hi  I was in Fri, 7/16, & Dr Onofre Al instructed me to schedule an ultrasound.    I understood that I couldn’t schedule it until I heard back fro

## 2021-07-22 ENCOUNTER — TELEPHONE (OUTPATIENT)
Dept: NEUROLOGY | Facility: CLINIC | Age: 66
End: 2021-07-22

## 2021-07-22 NOTE — TELEPHONE ENCOUNTER
Patient states she has an appointment for 7/29/21 for the blood flow test.  Verbalized understanding for results.

## 2021-07-22 NOTE — TELEPHONE ENCOUNTER
----- Message from Chilango Arguello MD sent at 7/19/2021  1:31 PM CDT -----  I personally reviewed a plain film x-ray of the left knee showing mild to moderate osteoarthritis. Please at the patient know that I reviewed her x-ray which shows arthritis.

## 2021-07-29 ENCOUNTER — HOSPITAL ENCOUNTER (OUTPATIENT)
Dept: ULTRASOUND IMAGING | Age: 66
Discharge: HOME | End: 2021-07-29
Attending: PHYSICAL MEDICINE & REHABILITATION
Payer: MEDICARE

## 2021-07-29 DIAGNOSIS — M79.89 LEG SWELLING: ICD-10-CM

## 2021-07-29 PROCEDURE — 93971 EXTREMITY STUDY: CPT | Performed by: PHYSICAL MEDICINE & REHABILITATION

## 2021-07-30 ENCOUNTER — TELEPHONE (OUTPATIENT)
Dept: NEUROLOGY | Facility: CLINIC | Age: 66
End: 2021-07-30

## 2021-07-30 NOTE — TELEPHONE ENCOUNTER
----- Message from Usama Haines MD sent at 7/29/2021  4:18 PM CDT -----  I reviewed a venous ultrasound which was negative for DVT but positive for a not quite a 1 inch spherical Baker's cyst.    Please let the patient know that she has a small Baker'

## 2021-08-13 ENCOUNTER — OFFICE VISIT (OUTPATIENT)
Dept: NEUROLOGY | Facility: CLINIC | Age: 66
End: 2021-08-13
Payer: MEDICARE

## 2021-08-13 VITALS — WEIGHT: 165 LBS | HEIGHT: 63 IN | BODY MASS INDEX: 29.23 KG/M2

## 2021-08-13 DIAGNOSIS — M17.12 PRIMARY OSTEOARTHRITIS OF LEFT KNEE: Primary | ICD-10-CM

## 2021-08-13 PROCEDURE — 99213 OFFICE O/P EST LOW 20 MIN: CPT | Performed by: PHYSICAL MEDICINE & REHABILITATION

## 2021-08-13 NOTE — PROGRESS NOTES
[address]  Progress Note    CHIEF COMPLAINT:  Patient presents with:  Knee Pain: Patinet present for f/u on left knee aching pain. LOV 7/15/21. Pain increase when bending leg increase the pain.  In therapy w Used    Vaping Use      Vaping Use: Never used    Substance and Sexual Activity      Alcohol use:  Yes        Alcohol/week: 0.0 standard drinks        Comment: social      Drug use: No      Sexual activity: Not Currently        Birth control/protection: Hys Left calf is slightly larger than the right, no pitting edema no discoloration  Neuro:   Cognition: alert & oriented x 3, attentive, able to follow 2 step commands, comprehention intact, spontaneous speech intact  Strength: Lower extremities have 5/5 stren

## 2021-09-26 PROBLEM — M17.12 PRIMARY OSTEOARTHRITIS OF LEFT KNEE: Status: ACTIVE | Noted: 2021-09-26

## 2021-09-26 PROBLEM — M79.89 LEG SWELLING: Status: ACTIVE | Noted: 2021-09-26

## 2021-10-20 ENCOUNTER — MED REC SCAN ONLY (OUTPATIENT)
Dept: PHYSICAL MEDICINE AND REHAB | Facility: CLINIC | Age: 66
End: 2021-10-20

## 2021-11-10 ENCOUNTER — HOSPITAL ENCOUNTER (OUTPATIENT)
Dept: MAMMOGRAPHY | Facility: HOSPITAL | Age: 66
Discharge: HOME | End: 2021-11-10
Attending: INTERNAL MEDICINE
Payer: MEDICARE

## 2021-11-10 DIAGNOSIS — Z12.31 SCREENING MAMMOGRAM FOR BREAST CANCER: ICD-10-CM

## 2021-11-10 PROCEDURE — 77067 SCR MAMMO BI INCL CAD: CPT | Performed by: INTERNAL MEDICINE

## 2021-11-10 PROCEDURE — 77063 BREAST TOMOSYNTHESIS BI: CPT | Performed by: INTERNAL MEDICINE

## 2021-11-15 ENCOUNTER — LAB ENCOUNTER (OUTPATIENT)
Dept: LAB | Facility: HOSPITAL | Age: 66
End: 2021-11-15
Attending: INTERNAL MEDICINE
Payer: MEDICARE

## 2021-11-15 ENCOUNTER — OFFICE VISIT (OUTPATIENT)
Dept: INTERNAL MEDICINE CLINIC | Facility: CLINIC | Age: 66
End: 2021-11-15
Payer: MEDICARE

## 2021-11-15 VITALS
HEIGHT: 63 IN | HEART RATE: 52 BPM | BODY MASS INDEX: 28.88 KG/M2 | WEIGHT: 163 LBS | RESPIRATION RATE: 20 BRPM | DIASTOLIC BLOOD PRESSURE: 84 MMHG | SYSTOLIC BLOOD PRESSURE: 124 MMHG | TEMPERATURE: 98 F

## 2021-11-15 DIAGNOSIS — Z00.00 ENCOUNTER FOR ANNUAL HEALTH EXAMINATION: Primary | ICD-10-CM

## 2021-11-15 DIAGNOSIS — Z23 NEED FOR VACCINATION: ICD-10-CM

## 2021-11-15 DIAGNOSIS — E78.5 HYPERLIPIDEMIA, UNSPECIFIED HYPERLIPIDEMIA TYPE: ICD-10-CM

## 2021-11-15 DIAGNOSIS — Z78.0 POST-MENOPAUSAL: ICD-10-CM

## 2021-11-15 DIAGNOSIS — Z12.11 COLON CANCER SCREENING: ICD-10-CM

## 2021-11-15 PROCEDURE — 36415 COLL VENOUS BLD VENIPUNCTURE: CPT | Performed by: INTERNAL MEDICINE

## 2021-11-15 PROCEDURE — 85025 COMPLETE CBC W/AUTO DIFF WBC: CPT | Performed by: INTERNAL MEDICINE

## 2021-11-15 PROCEDURE — G0009 ADMIN PNEUMOCOCCAL VACCINE: HCPCS | Performed by: INTERNAL MEDICINE

## 2021-11-15 PROCEDURE — 84443 ASSAY THYROID STIM HORMONE: CPT | Performed by: INTERNAL MEDICINE

## 2021-11-15 PROCEDURE — 90732 PPSV23 VACC 2 YRS+ SUBQ/IM: CPT | Performed by: INTERNAL MEDICINE

## 2021-11-15 PROCEDURE — 80053 COMPREHEN METABOLIC PANEL: CPT | Performed by: INTERNAL MEDICINE

## 2021-11-15 PROCEDURE — 80061 LIPID PANEL: CPT | Performed by: INTERNAL MEDICINE

## 2021-11-15 PROCEDURE — G0402 INITIAL PREVENTIVE EXAM: HCPCS | Performed by: INTERNAL MEDICINE

## 2021-11-15 RX ORDER — ERGOCALCIFEROL (VITAMIN D2) 10 MCG
400 TABLET ORAL DAILY
COMMUNITY
Start: 2021-04-05 | End: 2021-11-15

## 2021-11-15 NOTE — PATIENT INSTRUCTIONS
[facility] SCREENING SCHEDULE   Tests on this list are recommended by your physician but may not be covered, or covered at this frequency, by your insurer. Please check with your insurance carrier before scheduling to verify coverage.    Dillan Bowden 10/31/2019      No recommendations at this time   Pap and Pelvic    Pap   Covered every 2 years for women at normal risk;  Annually if at high risk -  Pap Smear,3 Years due on 05/11/2018    Chlamydia Annually if high risk -  No recommendations at this time regarding Advance Directives.

## 2021-11-15 NOTE — PROGRESS NOTES
HPI:   Lcu King is a 72year old female who presents for a Medicare Initial Preventative Physical Exam (Welcome to Medicare- < 12 months on Medicare). Patient is here for welcome to Medicare visit. Last seen here about 1 year ago.   At that [facility] Care on file in [address] [place] [address] Rd. She has never smoked tobacco.    CAGE screening score of 0 on 11/14/2021, showing low risk of alcohol abuse.         Patient Care Team: Patient Care Team:  Amina Palmer MD as PCP - General (Internal Medicine)    Patient Active P Lipid screening (11/15/2012), Neuroma of foot, Osteoporosis (10/22/15), Osteoporosis screening (8/22/2011), and Rosacea.     She  has a past surgical history that includes hysterectomy (2000); cholecystectomy (2004); total abdom hysterectomy (9/2000); other conversations: No   I have to worry about missing the telephone ring or doorbell: No I have trouble hearing conversations in a noisy background such as a crowded room or restaurant: No   I get confused about where sounds come from: No I misunderstand some Bowel sounds are normal.      Palpations: Abdomen is soft. There is no mass. Tenderness: There is no abdominal tenderness. Musculoskeletal:         General: No tenderness. Cervical back: Neck supple. Right lower leg: No edema.       Left lo BONE DENSITOMETRY (CPT=77080); Future    4. Need for vaccination  Vaccination status reviewed. Given Pneumovax 23 today. This will bring her fully up-to-date.  - PNEUMOCOCCAL IMM (PNEUMOVAX)    5.  Colon cancer screening  Patient is due for repeat colon c disease Lab Results   Component Value Date    CHOLEST 206 (H) 04/21/2021    HDL 62 (H) 04/21/2021     (H) 04/21/2021    TRIG 70 04/21/2021         Electrocardiogram (EKG)   Covered if needed at Welcome to Medicare, and non-screening if indicated for factors   -  No recommendations at this time    Tetanus Toxoid Not covered by Medicare Part B unless medically necessary (cut with metal); may be covered with your pharmacy prescription benefits -    Tetanus, Diptheria and Pertusis TD and TDaP Not covered

## 2021-11-19 ENCOUNTER — HOSPITAL ENCOUNTER (OUTPATIENT)
Dept: BONE DENSITY | Age: 66
Discharge: HOME | End: 2021-11-19
Attending: INTERNAL MEDICINE
Payer: MEDICARE

## 2021-11-19 DIAGNOSIS — Z78.0 POST-MENOPAUSAL: ICD-10-CM

## 2021-11-19 PROCEDURE — 77080 DXA BONE DENSITY AXIAL: CPT | Performed by: INTERNAL MEDICINE

## 2021-11-23 ENCOUNTER — TELEPHONE (OUTPATIENT)
Dept: DERMATOLOGY CLINIC | Facility: CLINIC | Age: 66
End: 2021-11-23

## 2021-11-23 NOTE — TELEPHONE ENCOUNTER
LOV 10/2020 - Spoke with pt to triage further. Pt states she has an issue with her thumb nail. Pt noticed it turning yellowish in color. Pt unsure if there was any trauma to the nail. Pt denies any recent nail salon visits.   Pt denies any s/s of infect

## 2021-11-23 NOTE — TELEPHONE ENCOUNTER
Pt is calling saying she is having an issue with a toe nail. Patient does have an appt 12/22 wants to know if she can be seen sooner.  plz call

## 2021-12-02 ENCOUNTER — OFFICE VISIT (OUTPATIENT)
Dept: OBGYN CLINIC | Facility: CLINIC | Age: 66
End: 2021-12-02
Payer: MEDICARE

## 2021-12-02 VITALS
WEIGHT: 161.81 LBS | DIASTOLIC BLOOD PRESSURE: 69 MMHG | BODY MASS INDEX: 28.67 KG/M2 | HEART RATE: 67 BPM | SYSTOLIC BLOOD PRESSURE: 100 MMHG | HEIGHT: 63 IN

## 2021-12-02 DIAGNOSIS — Z01.419 WELL WOMAN EXAM WITH ROUTINE GYNECOLOGICAL EXAM: Primary | ICD-10-CM

## 2021-12-02 PROCEDURE — G0101 CA SCREEN;PELVIC/BREAST EXAM: HCPCS | Performed by: CLINICAL NURSE SPECIALIST

## 2021-12-02 NOTE — PROGRESS NOTES
Curt Montero is a 72year old female  No LMP recorded. Patient has had a hysterectomy. No chief complaint on file. Last annual exam was last year. Last pap was  and normal, HPV negative. Pap's no longer needed d/t hysterectomy.  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     Constitutional: well developed, well nourished  Head/Face: normocephalic  Neck/Thyroid: thyroid symmetric, no thyromegaly, no nodules, no adenopathy  Lymphatic:no abnormal supraclavicular or axillary adenopathy is not

## 2021-12-09 NOTE — PROGRESS NOTES
Operative Report  Alysa Meyer MD (Physician) • • GASTROENTEROLOGY  Unsigned  Patient:   Heidy Vazquez. #:   [id number]                    Room: [facility]  Jose Guadalupe PARDO #:  [id number]     ADMITTED:   11/14/2011        OPERATIVE REPORT:     DATE:  11/ currently uncomplicated. 2. Internal hemorrhoids. RECOMMENDATIONS:  1. High fiber diet. 2. In the absence of any symptoms or signs, repeat colonoscopy in   ten     years.                  D:    11/14/2011 8:32 A  T:    11/15/2011 12:26 P  ATTENDING:

## 2021-12-10 ENCOUNTER — NURSE ONLY (OUTPATIENT)
Dept: GASTROENTEROLOGY | Facility: CLINIC | Age: 66
End: 2021-12-10

## 2021-12-10 DIAGNOSIS — Z12.11 SCREENING FOR COLON CANCER: Primary | ICD-10-CM

## 2021-12-10 NOTE — PROGRESS NOTES
Mike Escobar patient for her scheduled telephone colon screening.      PCP visit on 11/15/2021     CBC on 11/15/2021 show no signs of anemia     Last Procedure, Date, MD:  Colonoscopy 11/14/2011  Last Diagnosis:  Diverticulosis left colon, internal he

## 2021-12-13 RX ORDER — POLYETHYLENE GLYCOL 3350, SODIUM CHLORIDE, SODIUM BICARBONATE, POTASSIUM CHLORIDE 420; 11.2; 5.72; 1.48 G/4L; G/4L; G/4L; G/4L
POWDER, FOR SOLUTION ORAL
Qty: 4000 ML | Refills: 0 | Status: SHIPPED | OUTPATIENT
Start: 2021-12-13

## 2021-12-13 NOTE — PROGRESS NOTES
The patient's chart has been reviewed. Okay to schedule pt for 10 year CLN recall r/t screening for CLN CA with Dr. Niya Dickey.      Advise IV Twilight or MAC sedation with split dose Colyte/TriLyte or equivalent(eRx) preparation.   -Eligible for NE: Yes

## 2021-12-13 NOTE — PROGRESS NOTES
Scheduled for:  Colonoscopy 57235    Provider Name:  Dr. Arie Gagnon   Date:  4/19/2022  Location:  [facility]  Sedation:  MAC  Time:  7:30 am (pt is aware to arrive at 6:30 am)  Prep:  Split dose Colyte/trilyte   Meds/Allergies Reconciled?:  Jessica/PHI reviewed

## 2022-03-29 ENCOUNTER — OFFICE VISIT (OUTPATIENT)
Dept: PHYSICAL MEDICINE AND REHAB | Facility: CLINIC | Age: 67
End: 2022-03-29
Payer: MEDICARE

## 2022-03-29 VITALS — WEIGHT: 165 LBS | BODY MASS INDEX: 29.23 KG/M2 | HEIGHT: 63 IN

## 2022-03-29 DIAGNOSIS — M76.31 ILIOTIBIAL BAND SYNDROME OF RIGHT SIDE: Primary | ICD-10-CM

## 2022-03-29 DIAGNOSIS — M85.89 OSTEOPENIA OF MULTIPLE SITES: ICD-10-CM

## 2022-03-29 DIAGNOSIS — G47.00 INSOMNIA, UNSPECIFIED TYPE: ICD-10-CM

## 2022-03-29 PROCEDURE — 99214 OFFICE O/P EST MOD 30 MIN: CPT | Performed by: PHYSICAL MEDICINE & REHABILITATION

## 2022-03-29 RX ORDER — CYCLOBENZAPRINE HCL 10 MG
10 TABLET ORAL NIGHTLY
Qty: 30 TABLET | Refills: 0 | Status: SHIPPED | OUTPATIENT
Start: 2022-03-29 | End: 2022-04-28

## 2022-04-19 ENCOUNTER — HOSPITAL ENCOUNTER (OUTPATIENT)
Facility: HOSPITAL | Age: 67
Setting detail: HOSPITAL OUTPATIENT SURGERY
Discharge: HOME | End: 2022-04-19
Attending: INTERNAL MEDICINE | Admitting: INTERNAL MEDICINE
Payer: MEDICARE

## 2022-04-19 ENCOUNTER — ANESTHESIA EVENT (OUTPATIENT)
Dept: ENDOSCOPY | Facility: HOSPITAL | Age: 67
End: 2022-04-19
Payer: MEDICARE

## 2022-04-19 ENCOUNTER — MED REC SCAN ONLY (OUTPATIENT)
Dept: PHYSICAL MEDICINE AND REHAB | Facility: CLINIC | Age: 67
End: 2022-04-19

## 2022-04-19 ENCOUNTER — ANESTHESIA (OUTPATIENT)
Dept: ENDOSCOPY | Facility: HOSPITAL | Age: 67
End: 2022-04-19
Payer: MEDICARE

## 2022-04-19 VITALS
HEIGHT: 63 IN | OXYGEN SATURATION: 97 % | DIASTOLIC BLOOD PRESSURE: 72 MMHG | BODY MASS INDEX: 29.23 KG/M2 | WEIGHT: 165 LBS | RESPIRATION RATE: 18 BRPM | TEMPERATURE: 98 F | HEART RATE: 47 BPM | SYSTOLIC BLOOD PRESSURE: 113 MMHG

## 2022-04-19 DIAGNOSIS — Z12.11 SCREENING FOR COLON CANCER: ICD-10-CM

## 2022-04-19 PROCEDURE — 45385 COLONOSCOPY W/LESION REMOVAL: CPT | Performed by: INTERNAL MEDICINE

## 2022-04-19 PROCEDURE — 0DBP8ZX EXCISION OF RECTUM, VIA NATURAL OR ARTIFICIAL OPENING ENDOSCOPIC, DIAGNOSTIC: ICD-10-PCS | Performed by: INTERNAL MEDICINE

## 2022-04-19 RX ORDER — SODIUM CHLORIDE, SODIUM LACTATE, POTASSIUM CHLORIDE, CALCIUM CHLORIDE 600; 310; 30; 20 MG/100ML; MG/100ML; MG/100ML; MG/100ML
INJECTION, SOLUTION INTRAVENOUS CONTINUOUS
Status: DISCONTINUED | OUTPATIENT
Start: 2022-04-19 | End: 2022-04-19

## 2022-04-19 RX ORDER — LIDOCAINE HYDROCHLORIDE 10 MG/ML
INJECTION, SOLUTION EPIDURAL; INFILTRATION; INTRACAUDAL; PERINEURAL AS NEEDED
Status: DISCONTINUED | OUTPATIENT
Start: 2022-04-19 | End: 2022-04-19 | Stop reason: SURG

## 2022-04-19 RX ADMIN — LIDOCAINE HYDROCHLORIDE 50 MG: 10 INJECTION, SOLUTION EPIDURAL; INFILTRATION; INTRACAUDAL; PERINEURAL at 07:39:00

## 2022-04-19 RX ADMIN — SODIUM CHLORIDE, SODIUM LACTATE, POTASSIUM CHLORIDE, CALCIUM CHLORIDE: 600; 310; 30; 20 INJECTION, SOLUTION INTRAVENOUS at 07:38:00

## 2022-04-19 NOTE — OPERATIVE REPORT
[facility] Endoscopy Report      Date of Procedure:  04/19/22      Preoperative Diagnosis:  Colorectal cancer screening      Postoperative Diagnosis:  1. Rectal polyps  2. Colonic diverticulosis      Procedure:    Colonoscopy with polypectomy      Surgeon:  Claudene Lemons, M.D. Anesthesia:  Monitored anesthesia care  Cecal withdrawal time: 26 minutes  EBL:  Insignificant      Brief History: This is a 77year old female who presents for a screening colonoscopy. Her last colonoscopy was a little over 10 years prior and negative for adenomatous polyps. The patient has been asymptomatic from a lower gastrointestinal tract standpoint and is of average risk for colon cancer. Technique:  After informed consent, the patient was placed in the left lateral recumbent position. Digital rectal examination revealed no palpable intraluminal abnormalities. An Olympus variable stiffness 190 series HD colonoscope was inserted into the rectum and advanced under direct vision by following the lumen to the terminal ileum. The colon was examined upon withdrawal in the left lateral recumbent position. Findings:  The preparation of the colon was very good. The terminal ileum was examined for 5 cm and visually normal.  The ileocecal valve was well preserved. The visualized colonic mucosa from the cecum to the anal verge was normal with an intact vascular pattern. There were #3 3-4 mm sessile polyps in the proximal rectum which were cold snare excised and retrieved. No ongoing bleeding. There were a few diverticula seen in the proximal transverse colon and in the sigmoid without current signs of complication. There were no other colonic polyps, mass lesions, vascular anomalies or signs of inflammation seen. Retroflexion in the rectum revealed no abnormalities. The procedure was well tolerated without immediate complication. Impression:  1. Rectal polyps  2.   Uncomplicated colonic diverticulosis    Recommendations:  1. High-fiber diet. 2.  Follow-up biopsy results. Polyp histology to determine recommendations regarding follow-up.         Anaid Drake MD  4/19/2022

## 2022-04-19 NOTE — ANESTHESIA POSTPROCEDURE EVALUATION
Patient: Azael Brock    Procedure Summary     Date: 04/19/22 Room / Location: [address] [place] [address] ENDOSCOPY 04 / [facility] ENDOSCOPY    Anesthesia Start: 8313 Anesthesia Stop:     Procedure: COLONOSCOPY (N/A ) Diagnosis:       Screening for colon cancer      (Colon Polyps, Diverticulosis)    Surgeons: Yanet Peraza MD Anesthesiologist: Rody Briscoe CRNA    Anesthesia Type: MAC ASA Status: 2          Anesthesia Type: MAC    Vitals Value Taken Time   BP 92/50 04/19/22 0812   Temp 98 04/19/22 0812   Pulse 53 04/19/22 0812   Resp 15 04/19/22 0812   SpO2 99 04/19/22 0812       [facility] AN Post Evaluation:   Patient Evaluated in PACU  Patient Participation: complete - patient participated  Level of Consciousness: sleepy but conscious  Pain Score: 0  Pain Management: adequate  Airway Patency:patent  Dental exam unchanged from preop  Yes    Cardiovascular Status: acceptable  Respiratory Status: acceptable  Postoperative Hydration acceptable      Chino Ortega CRNA  4/19/2022 8:12 AM

## 2022-04-21 ENCOUNTER — TELEPHONE (OUTPATIENT)
Dept: GASTROENTEROLOGY | Facility: CLINIC | Age: 67
End: 2022-04-21

## 2022-04-21 NOTE — TELEPHONE ENCOUNTER
Recall colon in 7 years per Dr. Kapil Reynolds. Last BLSC:7-70-81  Next NYO:6-77-12    Updated health maintenance and pt outreach.

## 2022-04-21 NOTE — TELEPHONE ENCOUNTER
----- Message from Lien Allerd MD sent at 4/21/2022  5:28 PM CDT -----  I spoke to the patient. She is feeling well. She had #3 subcentimeter rectal polyps removed. #1 was a tubular adenoma and the other is hyperplastic. I have discussed the significance. I have recommended a high-fiber diet for diverticulosis and a surveillance colonoscopy in 7 years. GI RNs: Please enter colonoscopy recall for 7 years.

## 2022-06-10 NOTE — PROGRESS NOTES
HPI:    Patient ID: Alex Jean-Baptiste is a 58year old female. HPI  Patient is here requesting general physical exam.  Last seen here about 2 years ago. She seen the gynecologist since.   She is up-to-date on bone density screening and mammogram screening per JOB  2012: HAND/FINGER SURGERY UNLISTED      Comment: surgery (ruptured ligament in right thumb)  2000: HYSTERECTOMY  No date: GAYLE BIOPSY STEREOTACTIC NODULE 2 SITE BILAT      Comment: 2003  10/2004: OTHER SURGICAL HISTORY      Comment: \"GALLBLADDER PIEDRA total) by mouth daily. take 1 tablet (100MG)  by oral route daily Disp: 90 tablet Rfl: 3   Omega-3 Fatty Acids (RA FISH OIL) 1000 MG Oral Cap Take  by mouth.  Fish Oil 120 mg-180 mg capsule Disp:  Rfl:    Flaxseed, Linseed, (RA FLAX SEED OIL 1000) 1000 MG O PANEL, VITAMIN B12,        ASSAY, THYROID STIM HORMONE         Physical exam today is unremarkable. Active issues as below. Health maintenance issues reviewed. We will check fasting blood work and contact patient with results.   Encouraged continued diet n/a

## 2022-10-17 ENCOUNTER — PATIENT MESSAGE (OUTPATIENT)
Dept: INTERNAL MEDICINE CLINIC | Facility: CLINIC | Age: 67
End: 2022-10-17

## 2022-10-17 DIAGNOSIS — Z12.31 SCREENING MAMMOGRAM, ENCOUNTER FOR: ICD-10-CM

## 2022-10-17 DIAGNOSIS — Z12.31 SCREENING MAMMOGRAM FOR BREAST CANCER: Primary | ICD-10-CM

## 2022-10-18 NOTE — TELEPHONE ENCOUNTER
From: Anam Monday  To: Donn Adhikari MD  Sent: 10/17/2022 6:09 AM CDT  Subject: mammogram order/pneumonia vaccine    I don't have my annual physical scheduled until 1/6/2023. My mammogram is due 11/10/2022. Can I get an order for that from your office prior to my January visit? Is it OK to wait till January for my 2nd pneumonia vaccine? Thanks!

## 2022-10-18 NOTE — TELEPHONE ENCOUNTER
Patient requesting order for screening mammogram to have completed in 11/2022. Order pended for your approval.    Please see [employer] message below and advise. Thank you.       Future Appointments   Date Time Provider Chris Cote   1/6/2023  9:20 AM Cardell Essex, MD [facility]

## 2022-11-14 ENCOUNTER — HOSPITAL ENCOUNTER (OUTPATIENT)
Dept: MAMMOGRAPHY | Age: 67
Discharge: HOME | End: 2022-11-14
Attending: INTERNAL MEDICINE
Payer: MEDICARE

## 2022-11-14 DIAGNOSIS — Z12.31 SCREENING MAMMOGRAM FOR BREAST CANCER: ICD-10-CM

## 2022-11-14 PROCEDURE — 77067 SCR MAMMO BI INCL CAD: CPT | Performed by: INTERNAL MEDICINE

## 2022-11-14 PROCEDURE — 77063 BREAST TOMOSYNTHESIS BI: CPT | Performed by: INTERNAL MEDICINE

## 2022-11-16 RX ORDER — IBUPROFEN 400 MG/1
TABLET ORAL
COMMUNITY
Start: 2022-10-18

## 2022-11-17 ENCOUNTER — OFFICE VISIT (OUTPATIENT)
Dept: INTERNAL MEDICINE CLINIC | Facility: CLINIC | Age: 67
End: 2022-11-17
Payer: MEDICARE

## 2022-11-17 ENCOUNTER — LAB ENCOUNTER (OUTPATIENT)
Dept: LAB | Age: 67
End: 2022-11-17
Attending: NURSE PRACTITIONER
Payer: MEDICARE

## 2022-11-17 VITALS
SYSTOLIC BLOOD PRESSURE: 122 MMHG | HEART RATE: 54 BPM | WEIGHT: 170 LBS | HEIGHT: 63 IN | BODY MASS INDEX: 30.12 KG/M2 | DIASTOLIC BLOOD PRESSURE: 81 MMHG

## 2022-11-17 DIAGNOSIS — L71.9 ROSACEA: ICD-10-CM

## 2022-11-17 DIAGNOSIS — R13.13 PHARYNGEAL DYSPHAGIA: ICD-10-CM

## 2022-11-17 DIAGNOSIS — Z00.00 ANNUAL PHYSICAL EXAM: Primary | ICD-10-CM

## 2022-11-17 DIAGNOSIS — G47.00 INSOMNIA, UNSPECIFIED TYPE: ICD-10-CM

## 2022-11-17 LAB
ALBUMIN SERPL-MCNC: 3.9 G/DL (ref 3.4–5)
ALBUMIN/GLOB SERPL: 1.2 {RATIO} (ref 1–2)
ALP LIVER SERPL-CCNC: 93 U/L
ALT SERPL-CCNC: 29 U/L
ANION GAP SERPL CALC-SCNC: 7 MMOL/L (ref 0–18)
AST SERPL-CCNC: 24 U/L (ref 15–37)
BASOPHILS # BLD AUTO: 0.1 X10(3) UL (ref 0–0.2)
BASOPHILS NFR BLD AUTO: 1.9 %
BILIRUB SERPL-MCNC: 0.5 MG/DL (ref 0.1–2)
BUN BLD-MCNC: 13 MG/DL (ref 7–18)
BUN/CREAT SERPL: 16.3 (ref 10–20)
CALCIUM BLD-MCNC: 10 MG/DL (ref 8.5–10.1)
CHLORIDE SERPL-SCNC: 104 MMOL/L (ref 98–112)
CHOLEST SERPL-MCNC: 209 MG/DL (ref ?–200)
CO2 SERPL-SCNC: 24 MMOL/L (ref 21–32)
CREAT BLD-MCNC: 0.8 MG/DL
DEPRECATED RDW RBC AUTO: 46.9 FL (ref 35.1–46.3)
EOSINOPHIL # BLD AUTO: 0.13 X10(3) UL (ref 0–0.7)
EOSINOPHIL NFR BLD AUTO: 2.5 %
ERYTHROCYTE [DISTWIDTH] IN BLOOD BY AUTOMATED COUNT: 14.1 % (ref 11–15)
FASTING PATIENT LIPID ANSWER: YES
FASTING STATUS PATIENT QL REPORTED: YES
GFR SERPLBLD BASED ON 1.73 SQ M-ARVRAT: 81 ML/MIN/1.73M2 (ref 60–?)
GLOBULIN PLAS-MCNC: 3.3 G/DL (ref 2.8–4.4)
GLUCOSE BLD-MCNC: 92 MG/DL (ref 70–99)
HCT VFR BLD AUTO: 40.5 %
HDLC SERPL-MCNC: 59 MG/DL (ref 40–59)
HGB BLD-MCNC: 13.8 G/DL
IMM GRANULOCYTES # BLD AUTO: 0 X10(3) UL (ref 0–1)
IMM GRANULOCYTES NFR BLD: 0 %
LDLC SERPL CALC-MCNC: 130 MG/DL (ref ?–100)
LYMPHOCYTES # BLD AUTO: 1.33 X10(3) UL (ref 1–4)
LYMPHOCYTES NFR BLD AUTO: 25.8 %
MCH RBC QN AUTO: 30.7 PG (ref 26–34)
MCHC RBC AUTO-ENTMCNC: 34.1 G/DL (ref 31–37)
MCV RBC AUTO: 90.2 FL
MONOCYTES # BLD AUTO: 0.47 X10(3) UL (ref 0.1–1)
MONOCYTES NFR BLD AUTO: 9.1 %
NEUTROPHILS # BLD AUTO: 3.12 X10 (3) UL (ref 1.5–7.7)
NEUTROPHILS # BLD AUTO: 3.12 X10(3) UL (ref 1.5–7.7)
NEUTROPHILS NFR BLD AUTO: 60.7 %
NONHDLC SERPL-MCNC: 150 MG/DL (ref ?–130)
OSMOLALITY SERPL CALC.SUM OF ELEC: 280 MOSM/KG (ref 275–295)
PLATELET # BLD AUTO: 252 10(3)UL (ref 150–450)
POTASSIUM SERPL-SCNC: 4.5 MMOL/L (ref 3.5–5.1)
PROT SERPL-MCNC: 7.2 G/DL (ref 6.4–8.2)
RBC # BLD AUTO: 4.49 X10(6)UL
SODIUM SERPL-SCNC: 135 MMOL/L (ref 136–145)
TRIGL SERPL-MCNC: 111 MG/DL (ref 30–149)
TSI SER-ACNC: 2.41 MIU/ML (ref 0.36–3.74)
VLDLC SERPL CALC-MCNC: 20 MG/DL (ref 0–30)
WBC # BLD AUTO: 5.2 X10(3) UL (ref 4–11)

## 2022-11-17 PROCEDURE — 36415 COLL VENOUS BLD VENIPUNCTURE: CPT | Performed by: NURSE PRACTITIONER

## 2022-11-17 PROCEDURE — 80061 LIPID PANEL: CPT | Performed by: NURSE PRACTITIONER

## 2022-11-17 PROCEDURE — 85025 COMPLETE CBC W/AUTO DIFF WBC: CPT | Performed by: NURSE PRACTITIONER

## 2022-11-17 PROCEDURE — 80053 COMPREHEN METABOLIC PANEL: CPT | Performed by: NURSE PRACTITIONER

## 2022-11-17 PROCEDURE — 84443 ASSAY THYROID STIM HORMONE: CPT | Performed by: NURSE PRACTITIONER

## 2022-11-17 RX ORDER — SCOLOPAMINE TRANSDERMAL SYSTEM 1 MG/1
1 PATCH, EXTENDED RELEASE TRANSDERMAL
Qty: 10 PATCH | Refills: 0 | Status: SHIPPED | OUTPATIENT
Start: 2022-11-17 | End: 2022-12-17

## 2022-11-18 NOTE — ASSESSMENT & PLAN NOTE
Patient complaining of choking while eating. Plan  U. S of her thyroid  Speech for dysphagia      To schedule your thyroid U.S. please call [phone]    For Dysphagia ( Choking with swallowing). Please be evaluated by Speech.     [phone]

## 2022-12-01 ENCOUNTER — HOSPITAL ENCOUNTER (OUTPATIENT)
Dept: ULTRASOUND IMAGING | Age: 67
Discharge: HOME | End: 2022-12-01
Attending: NURSE PRACTITIONER
Payer: MEDICARE

## 2022-12-01 DIAGNOSIS — R13.13 PHARYNGEAL DYSPHAGIA: ICD-10-CM

## 2022-12-01 PROCEDURE — 76536 US EXAM OF HEAD AND NECK: CPT | Performed by: NURSE PRACTITIONER

## 2022-12-07 ENCOUNTER — TELEPHONE (OUTPATIENT)
Dept: SPEECH THERAPY | Facility: HOSPITAL | Age: 67
End: 2022-12-07

## 2022-12-12 ENCOUNTER — TELEPHONE (OUTPATIENT)
Dept: SPEECH THERAPY | Facility: HOSPITAL | Age: 67
End: 2022-12-12

## 2023-02-06 ENCOUNTER — OFFICE VISIT (OUTPATIENT)
Dept: DERMATOLOGY CLINIC | Facility: CLINIC | Age: 68
End: 2023-02-06

## 2023-02-06 DIAGNOSIS — D23.5 BENIGN NEOPLASM OF SKIN OF TRUNK: ICD-10-CM

## 2023-02-06 DIAGNOSIS — L82.1 SK (SEBORRHEIC KERATOSIS): Primary | ICD-10-CM

## 2023-02-06 DIAGNOSIS — D23.70 BENIGN NEOPLASM OF SKIN OF LOWER LIMB, INCLUDING HIP, UNSPECIFIED LATERALITY: ICD-10-CM

## 2023-02-06 DIAGNOSIS — D22.9 MULTIPLE NEVI: ICD-10-CM

## 2023-02-06 DIAGNOSIS — D23.30 BENIGN NEOPLASM OF SKIN OF FACE: ICD-10-CM

## 2023-02-06 DIAGNOSIS — L57.0 AK (ACTINIC KERATOSIS): ICD-10-CM

## 2023-02-06 DIAGNOSIS — D23.60 BENIGN NEOPLASM OF SKIN OF UPPER LIMB, INCLUDING SHOULDER, UNSPECIFIED LATERALITY: ICD-10-CM

## 2023-02-06 DIAGNOSIS — D23.4 BENIGN NEOPLASM OF SCALP AND SKIN OF NECK: ICD-10-CM

## 2023-02-06 DIAGNOSIS — L81.4 LENTIGO: ICD-10-CM

## 2023-02-06 PROCEDURE — 99213 OFFICE O/P EST LOW 20 MIN: CPT | Performed by: DERMATOLOGY

## 2023-02-06 PROCEDURE — 1126F AMNT PAIN NOTED NONE PRSNT: CPT | Performed by: DERMATOLOGY

## 2023-03-29 ENCOUNTER — WALK IN (OUTPATIENT)
Dept: URGENT CARE | Age: 68
End: 2023-03-29

## 2023-03-29 VITALS
WEIGHT: 171.96 LBS | OXYGEN SATURATION: 99 % | BODY MASS INDEX: 30.47 KG/M2 | SYSTOLIC BLOOD PRESSURE: 130 MMHG | HEIGHT: 63 IN | DIASTOLIC BLOOD PRESSURE: 80 MMHG | HEART RATE: 64 BPM | TEMPERATURE: 98.2 F

## 2023-03-29 DIAGNOSIS — H61.21 IMPACTED CERUMEN OF RIGHT EAR: ICD-10-CM

## 2023-03-29 DIAGNOSIS — H69.93 DYSFUNCTION OF BOTH EUSTACHIAN TUBES: ICD-10-CM

## 2023-03-29 DIAGNOSIS — J06.9 VIRAL UPPER RESPIRATORY TRACT INFECTION WITH COUGH: Primary | ICD-10-CM

## 2023-03-29 LAB
INTERNAL PROCEDURAL CONTROLS ACCEPTABLE: YES
S PYO AG THROAT QL IA.RAPID: NEGATIVE
TEST LOT EXPIRATION DATE: NORMAL
TEST LOT NUMBER: NORMAL

## 2023-03-29 PROCEDURE — 87880 STREP A ASSAY W/OPTIC: CPT | Performed by: NURSE PRACTITIONER

## 2023-03-29 PROCEDURE — 99203 OFFICE O/P NEW LOW 30 MIN: CPT | Performed by: NURSE PRACTITIONER

## 2023-03-29 RX ORDER — FLUTICASONE PROPIONATE 50 MCG
2 SPRAY, SUSPENSION (ML) NASAL DAILY
Qty: 16 G | Refills: 0 | Status: SHIPPED | OUTPATIENT
Start: 2023-03-29

## 2023-03-29 ASSESSMENT — ENCOUNTER SYMPTOMS
HEADACHES: 0
PSYCHIATRIC NEGATIVE: 1
WHEEZING: 0
RHINORRHEA: 1
NEUROLOGICAL NEGATIVE: 1
WEAKNESS: 0
ALLERGIC/IMMUNOLOGIC NEGATIVE: 1
NAUSEA: 0
ABDOMINAL PAIN: 0
CONSTIPATION: 0
SWOLLEN GLANDS: 0
CHILLS: 0
FATIGUE: 0
SEIZURES: 0
FEVER: 0
SHORTNESS OF BREATH: 0
DIZZINESS: 0
DIARRHEA: 0
EYES NEGATIVE: 1
HEMATOLOGIC/LYMPHATIC NEGATIVE: 1
ENDOCRINE NEGATIVE: 1
APPETITE CHANGE: 0
CONSTITUTIONAL NEGATIVE: 1
CHOKING: 0
COUGH: 1
LIGHT-HEADEDNESS: 0
VOMITING: 0
SORE THROAT: 1
GASTROINTESTINAL NEGATIVE: 1
SINUS PRESSURE: 1
SINUS PAIN: 0

## 2023-04-25 ENCOUNTER — OFFICE VISIT (OUTPATIENT)
Dept: PHYSICAL MEDICINE AND REHAB | Facility: CLINIC | Age: 68
End: 2023-04-25
Payer: MEDICARE

## 2023-04-25 ENCOUNTER — TELEPHONE (OUTPATIENT)
Dept: PHYSICAL MEDICINE AND REHAB | Facility: CLINIC | Age: 68
End: 2023-04-25

## 2023-04-25 ENCOUNTER — MED REC SCAN ONLY (OUTPATIENT)
Dept: PHYSICAL MEDICINE AND REHAB | Facility: CLINIC | Age: 68
End: 2023-04-25

## 2023-04-25 VITALS — SYSTOLIC BLOOD PRESSURE: 110 MMHG | HEART RATE: 59 BPM | DIASTOLIC BLOOD PRESSURE: 70 MMHG | OXYGEN SATURATION: 99 %

## 2023-04-25 DIAGNOSIS — M17.12 PRIMARY OSTEOARTHRITIS OF LEFT KNEE: Primary | ICD-10-CM

## 2023-04-25 DIAGNOSIS — M85.89 OSTEOPENIA OF MULTIPLE SITES: ICD-10-CM

## 2023-04-25 PROCEDURE — 99214 OFFICE O/P EST MOD 30 MIN: CPT | Performed by: PHYSICAL MEDICINE & REHABILITATION

## 2023-04-25 PROCEDURE — 20610 DRAIN/INJ JOINT/BURSA W/O US: CPT | Performed by: PHYSICAL MEDICINE & REHABILITATION

## 2023-04-25 RX ORDER — LIDOCAINE HYDROCHLORIDE 10 MG/ML
4 INJECTION, SOLUTION INFILTRATION; PERINEURAL ONCE
Status: COMPLETED | OUTPATIENT
Start: 2023-04-25 | End: 2023-04-25

## 2023-04-25 RX ORDER — TRIAMCINOLONE ACETONIDE 40 MG/ML
80 INJECTION, SUSPENSION INTRA-ARTICULAR; INTRAMUSCULAR ONCE
Status: COMPLETED | OUTPATIENT
Start: 2023-04-25 | End: 2023-04-25

## 2023-04-25 NOTE — TELEPHONE ENCOUNTER
Per Medicare Guidelines -no authorization is required for Left knee injection CPT 84123,      Status: Authorization is not required however may be subject to review once claim is submitted-Covered Benefit     inj done in office

## 2023-04-25 NOTE — PROCEDURES
Knee injection  Location: left  After discussing benefits and possible side effects, we proceeded with a knee injection. The patient was consented. The patient was placed in supine, and the lateral knee was palpated. The skin was sterilely prepped. Via a lateral approach a 25-gauge needle was introduced into the knee joint. 0 ml of sonya colored joint fluid were aspirated. A total of 2 cc of 40 mg/ml Kenalog and 4 cc of 1% lidocaine was injected into the knee. The patient tolerated the procedure well without adverse effects.

## 2023-10-24 ENCOUNTER — NURSE TRIAGE (OUTPATIENT)
Dept: INTERNAL MEDICINE CLINIC | Facility: CLINIC | Age: 68
End: 2023-10-24

## 2023-10-24 NOTE — TELEPHONE ENCOUNTER
Action Requested: Summary for Provider     []  Critical Lab, Recommendations Needed  [] Need Additional Advice  [x]   FYI    []   Need Orders  [] Need Medications Sent to Pharmacy  []  Other     SUMMARY: Patient states for last 1 month she has occasional dizziness, feeling of room spinning, lasting few seconds then it goes away. This happened once at the grocery store and then again during water aerobics. Patient states she does not feel dizzy now and does not experience vision changes,numbness or weakness in the face,arm or leg. Patient scheduled for an appointment next week as requested. She will keep appointment for her physical. She will call back with any new or worsening symptoms.      Future Appointments   Date Time Provider Chris Cote   10/30/2023 11:00 AM CLIFTON Greenberg [facility]   11/22/2023 10:20 AM CLIFTON Greenberg         Reason for call: Lightheadedness  Onset: 1 month  Reason for Disposition   Dizziness not present now, but is a chronic symptom (recurrent or ongoing AND lasting > 4 weeks)    Protocols used: Dizziness-A-OH

## 2023-10-30 ENCOUNTER — OFFICE VISIT (OUTPATIENT)
Dept: INTERNAL MEDICINE CLINIC | Facility: CLINIC | Age: 68
End: 2023-10-30

## 2023-10-30 ENCOUNTER — EKG ENCOUNTER (OUTPATIENT)
Dept: LAB | Age: 68
End: 2023-10-30
Attending: NURSE PRACTITIONER
Payer: MEDICARE

## 2023-10-30 ENCOUNTER — TELEPHONE (OUTPATIENT)
Dept: INTERNAL MEDICINE CLINIC | Facility: CLINIC | Age: 68
End: 2023-10-30

## 2023-10-30 ENCOUNTER — HOSPITAL ENCOUNTER (OUTPATIENT)
Dept: ULTRASOUND IMAGING | Age: 68
Discharge: HOME | End: 2023-10-30
Attending: NURSE PRACTITIONER
Payer: MEDICARE

## 2023-10-30 VITALS
BODY MASS INDEX: 30.12 KG/M2 | HEIGHT: 63 IN | SYSTOLIC BLOOD PRESSURE: 100 MMHG | HEART RATE: 59 BPM | DIASTOLIC BLOOD PRESSURE: 76 MMHG | WEIGHT: 170 LBS

## 2023-10-30 DIAGNOSIS — R42 LIGHTHEADED: Primary | ICD-10-CM

## 2023-10-30 DIAGNOSIS — Z13.6 ENCOUNTER FOR SCREENING FOR CORONARY ARTERY DISEASE: ICD-10-CM

## 2023-10-30 DIAGNOSIS — R42 LIGHTHEADED: ICD-10-CM

## 2023-10-30 DIAGNOSIS — R00.1 BRADYCARDIA: Primary | ICD-10-CM

## 2023-10-30 DIAGNOSIS — R06.02 SOB (SHORTNESS OF BREATH) ON EXERTION: ICD-10-CM

## 2023-10-30 DIAGNOSIS — R42 LIGHTHEADEDNESS: ICD-10-CM

## 2023-10-30 LAB
ATRIAL RATE: 53 BPM
P AXIS: 26 DEGREES
P-R INTERVAL: 162 MS
Q-T INTERVAL: 468 MS
QRS DURATION: 80 MS
QTC CALCULATION (BEZET): 439 MS
R AXIS: -29 DEGREES
T AXIS: 18 DEGREES
VENTRICULAR RATE: 53 BPM

## 2023-10-30 PROCEDURE — 93005 ELECTROCARDIOGRAM TRACING: CPT

## 2023-10-30 PROCEDURE — 93010 ELECTROCARDIOGRAM REPORT: CPT | Performed by: STUDENT IN AN ORGANIZED HEALTH CARE EDUCATION/TRAINING PROGRAM

## 2023-10-30 PROCEDURE — 93880 EXTRACRANIAL BILAT STUDY: CPT | Performed by: NURSE PRACTITIONER

## 2023-10-30 PROCEDURE — 99214 OFFICE O/P EST MOD 30 MIN: CPT | Performed by: NURSE PRACTITIONER

## 2023-10-30 NOTE — ASSESSMENT & PLAN NOTE
Patient has been experiencing episodes of lightheadedness several times a week. They last just for a few seconds. She could be standing in line at the store when they occur. Patient also notices more shortness of breath with .       Plan  EKG 12-LEAD- TODAY  CARDIO - INTERNAL- Follow up with Dr. Andrew Rodrigues STRESS(FKP=[id number] DMG [id number])  CARD MONITOR HOLTER 48 HOUR (CPT=93225)  US CAROTID DOPPLER BILAT - DIAG IMG (CPT=93880)  CT CALCIUM SCORING  Annual Labs will zainab peters ordered and follow up physical is scheduled for November

## 2023-11-10 ENCOUNTER — HOSPITAL ENCOUNTER (OUTPATIENT)
Dept: CV DIAGNOSTICS | Facility: HOSPITAL | Age: 68
Discharge: HOME | End: 2023-11-10
Attending: NURSE PRACTITIONER
Payer: MEDICARE

## 2023-11-10 DIAGNOSIS — R42 LIGHTHEADED: ICD-10-CM

## 2023-11-10 DIAGNOSIS — R06.02 SOB (SHORTNESS OF BREATH) ON EXERTION: ICD-10-CM

## 2023-11-10 PROCEDURE — 93225 XTRNL ECG REC<48 HRS REC: CPT | Performed by: NURSE PRACTITIONER

## 2023-11-10 PROCEDURE — 93350 STRESS TTE ONLY: CPT | Performed by: NURSE PRACTITIONER

## 2023-11-10 PROCEDURE — 93227 XTRNL ECG REC<48 HR R&I: CPT | Performed by: NURSE PRACTITIONER

## 2023-11-10 PROCEDURE — 93016 CV STRESS TEST SUPVJ ONLY: CPT | Performed by: INTERNAL MEDICINE

## 2023-11-10 PROCEDURE — 93017 CV STRESS TEST TRACING ONLY: CPT | Performed by: NURSE PRACTITIONER

## 2023-11-10 PROCEDURE — 93018 CV STRESS TEST I&R ONLY: CPT | Performed by: INTERNAL MEDICINE

## 2023-11-13 LAB
% OF MAX PREDICTED HR: 100 %
MAX DIASTOLIC BP: 62 MMHG
MAX HEART RATE: 142 BPM
MAX PREDICTED HEART RATE: 153 BPM
MAX SYSTOLIC BP: 195 MMHG
MAX WORK LOAD: 101

## 2023-11-18 ENCOUNTER — LAB ENCOUNTER (OUTPATIENT)
Dept: LAB | Facility: HOSPITAL | Age: 68
End: 2023-11-18
Attending: NURSE PRACTITIONER
Payer: MEDICARE

## 2023-11-19 LAB
ALBUMIN/GLOBULIN RATIO: 1.6 (CALC) (ref 1–2.5)
ALBUMIN: 4.2 G/DL (ref 3.6–5.1)
ALKALINE PHOSPHATASE: 96 U/L (ref 37–153)
ALT: 14 U/L (ref 6–29)
AST: 20 U/L (ref 10–35)
BILIRUBIN, TOTAL: 0.5 MG/DL (ref 0.2–1.2)
BUN: 12 MG/DL (ref 7–25)
CALCIUM: 9.8 MG/DL (ref 8.6–10.4)
CARBON DIOXIDE: 29 MMOL/L (ref 20–32)
CHLORIDE: 104 MMOL/L (ref 98–110)
CREATININE: 0.76 MG/DL (ref 0.5–1.05)
EGFR: 86 ML/MIN/1.73M2
GLOBULIN: 2.7 G/DL (CALC) (ref 1.9–3.7)
GLUCOSE: 89 MG/DL (ref 65–99)
HEMATOCRIT: 41 % (ref 35–45)
HEMOGLOBIN: 13.8 G/DL (ref 11.7–15.5)
MCH: 32.1 PG (ref 27–33)
MCHC: 33.7 G/DL (ref 32–36)
MCV: 95.3 FL (ref 80–100)
POTASSIUM: 4.9 MMOL/L (ref 3.5–5.3)
PROTEIN, TOTAL: 6.9 G/DL (ref 6.1–8.1)
RDW: 13.1 % (ref 11–15)
RED BLOOD CELL COUNT: 4.3 MILLION/UL (ref 3.8–5.1)
SODIUM: 138 MMOL/L (ref 135–146)
VITAMIN B12: 441 PG/ML (ref 200–1100)
VITAMIN D, 25-OH, TOTAL: 44 NG/ML (ref 30–100)
WHITE BLOOD CELL COUNT: 4.3 THOUSAND/UL (ref 3.8–10.8)

## 2023-11-22 ENCOUNTER — OFFICE VISIT (OUTPATIENT)
Dept: INTERNAL MEDICINE CLINIC | Facility: CLINIC | Age: 68
End: 2023-11-22
Payer: MEDICARE

## 2023-11-22 VITALS
HEART RATE: 60 BPM | HEIGHT: 63 IN | WEIGHT: 168 LBS | BODY MASS INDEX: 29.77 KG/M2 | SYSTOLIC BLOOD PRESSURE: 114 MMHG | DIASTOLIC BLOOD PRESSURE: 76 MMHG

## 2023-11-22 DIAGNOSIS — R53.83 FATIGUE, UNSPECIFIED TYPE: ICD-10-CM

## 2023-11-22 DIAGNOSIS — M85.89 OSTEOPENIA OF MULTIPLE SITES: ICD-10-CM

## 2023-11-22 DIAGNOSIS — H25.13 AGE-RELATED NUCLEAR CATARACT OF BOTH EYES: ICD-10-CM

## 2023-11-22 DIAGNOSIS — R42 LIGHTHEADEDNESS: ICD-10-CM

## 2023-11-22 DIAGNOSIS — Z12.31 ENCOUNTER FOR SCREENING MAMMOGRAM FOR MALIGNANT NEOPLASM OF BREAST: ICD-10-CM

## 2023-11-22 DIAGNOSIS — Z00.00 ANNUAL PHYSICAL EXAM: ICD-10-CM

## 2023-11-22 DIAGNOSIS — L71.9 ROSACEA: ICD-10-CM

## 2023-11-22 DIAGNOSIS — R13.13 PHARYNGEAL DYSPHAGIA: ICD-10-CM

## 2023-11-22 DIAGNOSIS — M21.6X2 PRONATION DEFORMITY OF BOTH FEET: ICD-10-CM

## 2023-11-22 DIAGNOSIS — M79.89 LEG SWELLING: ICD-10-CM

## 2023-11-22 DIAGNOSIS — E78.2 MODERATE MIXED HYPERLIPIDEMIA NOT REQUIRING STATIN THERAPY: Primary | ICD-10-CM

## 2023-11-22 DIAGNOSIS — M76.31 ILIOTIBIAL BAND SYNDROME OF RIGHT SIDE: ICD-10-CM

## 2023-11-22 DIAGNOSIS — G47.00 INSOMNIA, UNSPECIFIED TYPE: ICD-10-CM

## 2023-11-22 DIAGNOSIS — M21.6X1 PRONATION DEFORMITY OF BOTH FEET: ICD-10-CM

## 2023-11-22 NOTE — ASSESSMENT & PLAN NOTE
Patient with dizziness that is less frequent. She is following with cardiology for testing.     Plan  EKG 12-LEAD- Sinus bradycardia - Following with Dr. Real Reich STRESS(BFU=[id number] DMG [id number])- Negative  CARD MONITOR HOLTER 48 HOUR (CPT=93225)- Negative  US CAROTID DOPPLER BILAT - DIAG IMG (CPT=93880)- Pending  CT CALCIUM SCORING- Pending  Annual Labs reviewed

## 2023-11-22 NOTE — ASSESSMENT & PLAN NOTE
No complaints at this vsit. Had thyroid exam last year      Impression   CONCLUSION:  1. Heterogeneous thyroid with size asymmetry, but no discrete nodule.        No further Tx plan at this time

## 2023-11-22 NOTE — ASSESSMENT & PLAN NOTE
Normal exam.  Labs as ordered. Skin check normal.  No significant abnormal nevi. Breast exam completed-no palpable abnormalities, discharge from the nipples or axillary adenopathy. No cervical or inguinal lymphadenopathy. Hernial orifices intact. Hysterectomy (Total).   Immunizations- Up to date

## 2023-11-22 NOTE — ASSESSMENT & PLAN NOTE
Wt Readings from Last 6 Encounters:   11/22/23 168 lb (76.2 kg)   10/30/23 170 lb (77.1 kg)   11/17/22 170 lb (77.1 kg)   04/19/22 165 lb (74.8 kg)   03/29/22 165 lb (74.8 kg)   12/02/21 161 lb 12.8 oz (73.4 kg)     Patient will work on diet and exercise

## 2023-11-28 ENCOUNTER — HOSPITAL ENCOUNTER (OUTPATIENT)
Dept: CT IMAGING | Age: 68
Discharge: HOME | End: 2023-11-28
Attending: NURSE PRACTITIONER

## 2023-11-28 DIAGNOSIS — Z13.6 ENCOUNTER FOR SCREENING FOR CORONARY ARTERY DISEASE: Primary | ICD-10-CM

## 2023-11-28 DIAGNOSIS — Z13.6 ENCOUNTER FOR SCREENING FOR CORONARY ARTERY DISEASE: ICD-10-CM

## 2023-11-28 NOTE — PROGRESS NOTES
Date of Service 11/28/2023    Madisyn Whitfield  Date of Birth 12/5/1955    Patient Age: 79year old    PCP: Melisa Martini MD  [address] [facility] [address]    Heart Scan Consult  Preliminary Heart Scan Score: 565    Previous Screening  Heart Scan Completed Previously: No        Peripheral Vascular Scan Completed Previously:  (Carotid Doppler done 10/30/23 - mild plaque bilaterally)          Risk Factors  Personal Risk Factors  Non-alterable Risk Factors: Family History (Father had MI in his 29's and later had bypass)  Alterable Risk Factors: Abnormal Cholesterol;Obesity      Body Mass Index  There is no height or weight on file to calculate BMI. Blood Pressure  /76 no med  (Normal =< 120/80,  Elevated = 120-129/ >80,  High Stage1 130-139/80-89 , Stage2 >140/>90)    Lipid Profile  Cholesterol: 209, done on 11/17/2022. HDL Cholesterol: 59, done on 11/17/2022. LDL Cholesterol: 130, done on 11/17/2022. TriGlycerides 111, done on 11/17/2022. She has an active order for lipids and will have done in the morning. She drank juice before coming to her appointment today. She is not on med. Cholesterol Goals  Value   Total  =< 200   HDL  = > 45 Men = > 55 Women   LDL   =< 100   Triglycerides  =< 150       Glucose and Hemoglobin A1C  Lab Results   Component Value Date    GLU 89 11/18/2023     (Normal Fasting Glucose < 100mg/dl )    Nurse Review  Risk factor information and results reviewed with Nurse: Yes    She has appointment with Dr. Mary Seen on Friday, December 1. Recommended that she take her preliminary results with her to this appointment. Discuss the Carotid Doppler that she had done on 10/30/23 that did show mild plaque on both the right and left sides. Recommended Follow Up:  Consult your physician regarding[de-identified] Final Heart Scan Report; Discuss potential for Incidental Finding      Recommendations for Change:  Nutrition Changes: Low Saturated Fat; Increase Fiber    Cholesterol Modification (goal of therapy depends upon your risk): Decrease LDL (Lousy/Bad) Ideal <100    Exercise: Enhance Current Program    Smoking Cessation: No Change Needed    Weight Management: Decrease Current Weight    Stress Management: Adopt Stress Management Techniques    Repeat Heart Scan: Discuss with your Physician              [facility] Recommended Resources:  Recommended Resources: Upcoming Classes, Medical Services and Health Library www. Health. [facility] Mark RN        Please Contact the Nurse Heart Line with any Questions or Concerns [phone].

## 2023-11-29 ENCOUNTER — LAB ENCOUNTER (OUTPATIENT)
Dept: LAB | Facility: HOSPITAL | Age: 68
End: 2023-11-29
Attending: NURSE PRACTITIONER
Payer: MEDICARE

## 2023-11-29 DIAGNOSIS — R53.83 FATIGUE, UNSPECIFIED TYPE: ICD-10-CM

## 2023-11-29 DIAGNOSIS — E78.2 MODERATE MIXED HYPERLIPIDEMIA NOT REQUIRING STATIN THERAPY: ICD-10-CM

## 2023-11-29 LAB
CHOLEST SERPL-MCNC: 185 MG/DL (ref ?–200)
FASTING PATIENT LIPID ANSWER: YES
HDLC SERPL-MCNC: 56 MG/DL (ref 40–59)
LDLC SERPL CALC-MCNC: 115 MG/DL (ref ?–100)
NONHDLC SERPL-MCNC: 129 MG/DL (ref ?–130)
T4 FREE SERPL-MCNC: 0.9 NG/DL (ref 0.8–1.7)
TRIGL SERPL-MCNC: 74 MG/DL (ref 30–149)
TSI SER-ACNC: 6.62 MIU/ML (ref 0.55–4.78)
VLDLC SERPL CALC-MCNC: 13 MG/DL (ref 0–30)

## 2023-11-29 PROCEDURE — 84439 ASSAY OF FREE THYROXINE: CPT

## 2023-11-29 PROCEDURE — 84443 ASSAY THYROID STIM HORMONE: CPT

## 2023-11-29 PROCEDURE — 80061 LIPID PANEL: CPT

## 2023-11-29 PROCEDURE — 36415 COLL VENOUS BLD VENIPUNCTURE: CPT

## 2024-02-05 ENCOUNTER — HOSPITAL ENCOUNTER (OUTPATIENT)
Dept: MAMMOGRAPHY | Facility: HOSPITAL | Age: 69
Discharge: HOME | End: 2024-02-05
Attending: NURSE PRACTITIONER
Payer: MEDICARE

## 2024-02-05 DIAGNOSIS — Z12.31 ENCOUNTER FOR SCREENING MAMMOGRAM FOR MALIGNANT NEOPLASM OF BREAST: ICD-10-CM

## 2024-02-05 PROCEDURE — 77063 BREAST TOMOSYNTHESIS BI: CPT | Performed by: NURSE PRACTITIONER

## 2024-02-05 PROCEDURE — 77067 SCR MAMMO BI INCL CAD: CPT | Performed by: NURSE PRACTITIONER

## 2024-03-15 ENCOUNTER — OFFICE VISIT (OUTPATIENT)
Dept: DERMATOLOGY CLINIC | Facility: CLINIC | Age: 69
End: 2024-03-15
Payer: MEDICARE

## 2024-03-15 DIAGNOSIS — L30.9 DERMATITIS: ICD-10-CM

## 2024-03-15 DIAGNOSIS — L81.4 LENTIGO: ICD-10-CM

## 2024-03-15 DIAGNOSIS — L57.0 AK (ACTINIC KERATOSIS): Primary | ICD-10-CM

## 2024-03-15 DIAGNOSIS — L82.1 SK (SEBORRHEIC KERATOSIS): ICD-10-CM

## 2024-03-15 DIAGNOSIS — D22.9 MULTIPLE NEVI: ICD-10-CM

## 2024-03-15 DIAGNOSIS — D23.9 BENIGN NEOPLASM OF SKIN, UNSPECIFIED LOCATION: ICD-10-CM

## 2024-03-15 PROCEDURE — 17000 DESTRUCT PREMALG LESION: CPT | Performed by: DERMATOLOGY

## 2024-03-15 PROCEDURE — 99213 OFFICE O/P EST LOW 20 MIN: CPT | Performed by: DERMATOLOGY

## 2024-03-15 RX ORDER — ASPIRIN 81 MG/1
TABLET ORAL
COMMUNITY
Start: 2023-11-13

## 2024-03-25 NOTE — PROGRESS NOTES
Candelaria Cuellar is a 68 year old female.  HPI:     CC:    Chief Complaint   Patient presents with    Full Skin Exam     LOV 02/23. Pt present for full body exam. Hx of Aks and Sks. Pt denies any areas of concern.          Allergies:  Vicodin  [hydrocodone-acetaminophen]    HISTORY:    Past Medical History:   Diagnosis Date    Abnormal mammogram 2012    \"abn MMG\", Left breast bx-benign    History of bone density study 08/2011    NL DEXA    Injury of ligament of hand     ruptured ligament in right thumb, surgery 2012    Lipid screening 11/15/2012    per NG    Neuroma of foot     right foot, steroid injection with releif of sx    Osteoarthritis March 2021    Left knee    Osteoporosis 10/22/2015    Actonel    Osteoporosis screening 08/22/2011    per NG    Rosacea     meds    UTI (urinary tract infection) July 2018      Past Surgical History:   Procedure Laterality Date    BREAST BIOPSY Left     benign    BREAST BIOPSY Right     2002    CHOLECYSTECTOMY  2004    COLONOSCOPY  11/2011    COLONOSCOPY  11/14/2011    per NG    COLONOSCOPY N/A 4/19/2022    Procedure: COLONOSCOPY;  Surgeon: Aftab Rosado MD;  Location: [facility] ENDOSCOPY    HAND/FINGER SURGERY UNLISTED  2012    surgery (ruptured ligament in right thumb)    HYSTERECTOMY  2000    GAYLE BIOPSY STEREO NODULE 1 SITE LEFT (CPT=19081)      2001    GAYLE BIOPSY STEREO NODULE 1 SITE RIGHT (CPT=19081)      2001    GAYLE BIOPSY STEREO NODULE 2 SITE BILAT (CPT=19081/87973)      2003    OTHER SURGICAL HISTORY  10/2004    \"GALLBLADDER SURGERY\"    TOTAL ABDOM HYSTERECTOMY  9/2000    TOBIAS/BSO      Family History   Problem Relation Age of Onset    Lipids Mother         hyperlipidemia    Hypertension Mother     Glaucoma Mother     Cataracts Mother     Diabetes Father     Seizure Disorder Father         epilepsy    Heart Disease Father     Kidney Disease Father     Heart Disorder Father         CHF    Hypertension Father     Cancer Brother         testicular    Alcohol and Other  Disorders Associated Brother         alcoholism    Cancer Brother 70        AML    Ovarian Cancer Maternal Aunt 70      Social History     Socioeconomic History    Marital status: Single   Occupational History    Occupation: Dentist   Tobacco Use    Smoking status: Former    Smokeless tobacco: Never    Tobacco comments:     A few cigarettes a few days/ week   Vaping Use    Vaping Use: Never used   Substance and Sexual Activity    Alcohol use: Yes     Alcohol/week: 8.0 standard drinks of alcohol     Types: 6 Glasses of wine, 2 Cans of beer per week     Comment: social    Drug use: No    Sexual activity: Not Currently     Birth control/protection: Hysterectomy   Other Topics Concern    Grew up on a farm No    History of tanning No    Outdoor occupation No    Pt has a pacemaker No    Pt has a defibrillator No    Breast feeding No    Reaction to local anesthetic No    Caffeine Concern Yes     Comment: 2 cups coffee daily    Exercise Yes   Social History Narrative    The patient does not use an assistive device..      The patient does live in a home with stairs.        Current Outpatient Medications   Medication Sig Dispense Refill    Magnesium 125 MG Oral Cap       aspirin (ASPIR-LOW) 81 MG Oral Tab EC       Calcium-Magnesium-Vitamin D (CALCIUM 1200+D3 OR)       ibuprofen 400 MG Oral Tab       Omega-3 Fatty Acids (RA FISH OIL) 1000 MG Oral Cap Take  by mouth. Fish Oil 120 mg-180 mg capsule      Flaxseed, Linseed, (RA FLAX SEED OIL 1000) 1000 MG Oral Cap Take  by mouth. flaxseed oil 1,000 mg capsule      Multiple Vitamins-Minerals (MULTIVITAMIN OR) Take  by mouth. Take one tablet by mouth daily      polypropylene glycol- 0.4-0.3 % Ophthalmic Solution Apply  to eye. Use one drop each eye PRN.      vitamin E 400 UNITS Oral Cap Take  by mouth. vitamin E 400 unit capsule      Doxycycline Hyclate 100 MG Oral Cap Take po qd (Patient not taking: Reported on 3/15/2024) 90 capsule 3     Allergies:   Allergies   Allergen  Reactions    Vicodin  [Hydrocodone-Acetaminophen] RASH       Past Medical History:   Diagnosis Date    Abnormal mammogram 2012    \"abn MMG\", Left breast bx-benign    History of bone density study 08/2011    NL DEXA    Injury of ligament of hand     ruptured ligament in right thumb, surgery 2012    Lipid screening 11/15/2012    per NG    Neuroma of foot     right foot, steroid injection with releif of sx    Osteoarthritis March 2021    Left knee    Osteoporosis 10/22/2015    Actonel    Osteoporosis screening 08/22/2011    per NG    Rosacea     meds    UTI (urinary tract infection) July 2018     Past Surgical History:   Procedure Laterality Date    BREAST BIOPSY Left     benign    BREAST BIOPSY Right     2002    CHOLECYSTECTOMY  2004    COLONOSCOPY  11/2011    COLONOSCOPY  11/14/2011    per NG    COLONOSCOPY N/A 4/19/2022    Procedure: COLONOSCOPY;  Surgeon: Aftab Rosado MD;  Location: [facility] ENDOSCOPY    HAND/FINGER SURGERY UNLISTED  2012    surgery (ruptured ligament in right thumb)    HYSTERECTOMY  2000    GAYLE BIOPSY STEREO NODULE 1 SITE LEFT (CPT=19081)      2001    GAYLE BIOPSY STEREO NODULE 1 SITE RIGHT (CPT=19081)      2001    GAYLE BIOPSY STEREO NODULE 2 SITE BILAT (CPT=19081/08310)      2003    OTHER SURGICAL HISTORY  10/2004    \"GALLBLADDER SURGERY\"    TOTAL ABDOM HYSTERECTOMY  9/2000    TOBIAS/BSO     Social History     Socioeconomic History    Marital status: Single     Spouse name: Not on file    Number of children: Not on file    Years of education: Not on file    Highest education level: Not on file   Occupational History    Occupation: Dentist   Tobacco Use    Smoking status: Former    Smokeless tobacco: Never    Tobacco comments:     A few cigarettes a few days/ week   Vaping Use    Vaping Use: Never used   Substance and Sexual Activity    Alcohol use: Yes     Alcohol/week: 8.0 standard drinks of alcohol     Types: 6 Glasses of wine, 2 Cans of beer per week     Comment: social    Drug use: No    Sexual  activity: Not Currently     Birth control/protection: Hysterectomy   Other Topics Concern    Grew up on a farm No    History of tanning No    Outdoor occupation No    Pt has a pacemaker No    Pt has a defibrillator No    Breast feeding No    Reaction to local anesthetic No     Service Not Asked    Blood Transfusions Not Asked    Caffeine Concern Yes     Comment: 2 cups coffee daily    Occupational Exposure Not Asked    Hobby Hazards Not Asked    Sleep Concern Not Asked    Stress Concern Not Asked    Weight Concern Not Asked    Special Diet Not Asked    Back Care Not Asked    Exercise Yes    Bike Helmet Not Asked    Seat Belt Not Asked    Self-Exams Not Asked   Social History Narrative    The patient does not use an assistive device..      The patient does live in a home with stairs.     Social Determinants of Health     Financial Resource Strain: Not on file   Food Insecurity: Not on file   Transportation Needs: Not on file   Physical Activity: Not on file   Stress: Not on file   Social Connections: Not on file   Housing Stability: Not on file     Family History   Problem Relation Age of Onset    Lipids Mother         hyperlipidemia    Hypertension Mother     Glaucoma Mother     Cataracts Mother     Diabetes Father     Seizure Disorder Father         epilepsy    Heart Disease Father     Kidney Disease Father     Heart Disorder Father         CHF    Hypertension Father     Cancer Brother         testicular    Alcohol and Other Disorders Associated Brother         alcoholism    Cancer Brother 70        AML    Ovarian Cancer Maternal Aunt 70       There were no vitals filed for this visit.    HPI:    Chief Complaint   Patient presents with    Full Skin Exam     LOV 02/23. Pt present for full body exam. Hx of Aks and Sks. Pt denies any areas of concern.      Follow-up history AK's patient concerned several lesions over the face legs.  Careful sunscreen use noted.  Does have some protective clothing.  Past notes/  records and appropriate/relevant lab results including pathology and past body maps reviewed. Updated and new information noted in current visit.     History of actinic keratoses here for skin check no personal or family history of skin cancer    History of rosacea in the past stable on metronidazole patient presents with concerns above.    Patient has been in their usual state of health.  History, medications, allergies reviewed as noted.      ROS:  Denies any other systemic complaints.  No new or changeing lesions other than noted above. No fevers, chills, night sweats, unusual sun sensitivity.  No other skin complaints.        History, medications, allergies reviewed as noted.       Physical Examination:     Well-developed well-nourished patient alert oriented in no acute distress.  Exam total-body performed, including scalp, head, neck, face,nails, hair, external eyes, including conjunctival mucosa, eyelids, lips external ears, back, chest,/ breasts, axillae,  abdomen, arms, legs, palms.     Multiple light to medium brown, well marginated, uniformly pigmented, macules and papules 6 mm and less scattered on exam. pigmented lesions examined with dermoscopy benign-appearing patterns.     Waxy tannish keratotic papules scattered, cherry-red vascular papules scattered.    See map today's date for lesions noted .      Otherwise remarkable for lesions as noted on map.  See details of examination  See Assessment /Plan for additional history and physical exam also:    Assessment / plan:    No orders of the defined types were placed in this encounter.      Meds & Refills for this Visit:  Requested Prescriptions      No prescriptions requested or ordered in this encounter         Encounter Diagnoses   Name Primary?    AK (actinic keratosis) Yes    SK (seborrheic keratosis)     Multiple nevi     Lentigo     Benign neoplasm of skin, unspecified location     Dermatitis        See details on map.      Remarkable  for:    Multiple benign keratoses over the face chest back legs.  Reassurance given.  Continue sunscreen sun protection  Erythematous scaling keratotic papules noted at sites noted on map  Actinic Keratoses.  Precancerous nature discussed. Sun protection, sunscreen/ blocks encouraged Lesions treated with cryo- .  Biopsy if not resolved.    Right chest   continue observation left wrist, temples.  Extensive lentigines noted.  Continue sun protection regular checks.    Rosacea fairly well controlled metronidazole, doxycycline as needed.  Mild erythema over the nose medial cheeks  .Rosacea.  Meds in grid.  Skin care instructions reviewed.  Pathophysiology reviewed.  Chronic recurrent nature discussed.  Patient will let us know how they are doing over the next several weeks.  Await clinical response to above therapy.    Patient with recent dermatitis to necklace.  Resolving.  Hydrocortisone as needed    History of AK's continue careful sun protection monitoring.  More prominent SKs shoulders upper back lower leg  Multiple benign keratoses extensive lentigines no significant changes no new suspicious lesions  Please refer to map for specific lesions.  See additional diagnoses.  Pros cons of various therapies, risks benefits discussed.Pathophysiology discussed with patient.  Therapeutic options reviewed.  See  Medications in grid.  Instructions reviewed at length.    Benign nevi, seborrheic  keratoses, cherry angiomas:  Reassurance regarding other benign skin lesions.Signs and symptoms of skin cancer, ABCDE's of melanoma discussed with patient. Sunscreen use, sun protection, self exams reviewed.  Followup as noted RTC routine checkup 6 mos - one year or p.r.n.    The patient indicates understanding of these issues and agrees to the plan.  The patient is asked to return as noted in follow-up/ above.    This note was generated using Dragon voice recognition software.  Please contact me regarding any confusion resulting from  errors in recognition.

## 2024-05-29 ENCOUNTER — LAB ENCOUNTER (OUTPATIENT)
Dept: LAB | Facility: HOSPITAL | Age: 69
End: 2024-05-29
Attending: INTERNAL MEDICINE
Payer: MEDICARE

## 2024-05-29 DIAGNOSIS — E78.2 MIXED HYPERLIPIDEMIA: Primary | ICD-10-CM

## 2024-05-29 LAB
CHOLEST SERPL-MCNC: 211 MG/DL (ref ?–200)
FASTING PATIENT LIPID ANSWER: YES
HDLC SERPL-MCNC: 60 MG/DL (ref 40–59)
LDLC SERPL CALC-MCNC: 133 MG/DL (ref ?–100)
NONHDLC SERPL-MCNC: 151 MG/DL (ref ?–130)
TRIGL SERPL-MCNC: 100 MG/DL (ref 30–149)
VLDLC SERPL CALC-MCNC: 18 MG/DL (ref 0–30)

## 2024-05-29 PROCEDURE — 36415 COLL VENOUS BLD VENIPUNCTURE: CPT

## 2024-05-29 PROCEDURE — 80061 LIPID PANEL: CPT

## 2024-12-02 ENCOUNTER — LAB ENCOUNTER (OUTPATIENT)
Dept: LAB | Facility: HOSPITAL | Age: 69
End: 2024-12-02
Attending: INTERNAL MEDICINE
Payer: MEDICARE

## 2024-12-02 DIAGNOSIS — E78.2 MIXED HYPERLIPIDEMIA: Primary | ICD-10-CM

## 2024-12-02 LAB
ALBUMIN SERPL-MCNC: 4.6 G/DL (ref 3.2–4.8)
ALBUMIN/GLOB SERPL: 1.7 {RATIO} (ref 1–2)
ALP LIVER SERPL-CCNC: 97 U/L
ALT SERPL-CCNC: 17 U/L
ANION GAP SERPL CALC-SCNC: 5 MMOL/L (ref 0–18)
AST SERPL-CCNC: 21 U/L (ref ?–34)
BILIRUB SERPL-MCNC: 0.6 MG/DL (ref 0.2–1.1)
BUN BLD-MCNC: 17 MG/DL (ref 9–23)
BUN/CREAT SERPL: 20.5 (ref 10–20)
CALCIUM BLD-MCNC: 10 MG/DL (ref 8.7–10.4)
CHLORIDE SERPL-SCNC: 108 MMOL/L (ref 98–112)
CHOLEST SERPL-MCNC: 164 MG/DL (ref ?–200)
CO2 SERPL-SCNC: 27 MMOL/L (ref 21–32)
CREAT BLD-MCNC: 0.83 MG/DL
EGFRCR SERPLBLD CKD-EPI 2021: 77 ML/MIN/1.73M2 (ref 60–?)
FASTING PATIENT LIPID ANSWER: YES
FASTING STATUS PATIENT QL REPORTED: YES
GLOBULIN PLAS-MCNC: 2.7 G/DL (ref 2–3.5)
GLUCOSE BLD-MCNC: 88 MG/DL (ref 70–99)
HDLC SERPL-MCNC: 67 MG/DL (ref 40–59)
LDLC SERPL CALC-MCNC: 83 MG/DL (ref ?–100)
NONHDLC SERPL-MCNC: 97 MG/DL (ref ?–130)
OSMOLALITY SERPL CALC.SUM OF ELEC: 291 MOSM/KG (ref 275–295)
POTASSIUM SERPL-SCNC: 4.4 MMOL/L (ref 3.5–5.1)
PROT SERPL-MCNC: 7.3 G/DL (ref 5.7–8.2)
SODIUM SERPL-SCNC: 140 MMOL/L (ref 136–145)
TRIGL SERPL-MCNC: 71 MG/DL (ref 30–149)
VLDLC SERPL CALC-MCNC: 11 MG/DL (ref 0–30)

## 2024-12-02 PROCEDURE — 36415 COLL VENOUS BLD VENIPUNCTURE: CPT

## 2024-12-02 PROCEDURE — 80053 COMPREHEN METABOLIC PANEL: CPT

## 2024-12-02 PROCEDURE — 80061 LIPID PANEL: CPT

## 2024-12-09 ENCOUNTER — OFFICE VISIT (OUTPATIENT)
Dept: INTERNAL MEDICINE CLINIC | Facility: CLINIC | Age: 69
End: 2024-12-09
Payer: MEDICARE

## 2024-12-09 VITALS
DIASTOLIC BLOOD PRESSURE: 60 MMHG | SYSTOLIC BLOOD PRESSURE: 120 MMHG | WEIGHT: 168 LBS | HEART RATE: 66 BPM | HEIGHT: 63 IN | BODY MASS INDEX: 29.77 KG/M2

## 2024-12-09 DIAGNOSIS — M17.12 PRIMARY OSTEOARTHRITIS OF LEFT KNEE: ICD-10-CM

## 2024-12-09 DIAGNOSIS — M85.89 OSTEOPENIA OF MULTIPLE SITES: ICD-10-CM

## 2024-12-09 DIAGNOSIS — Z12.31 ENCOUNTER FOR SCREENING MAMMOGRAM FOR MALIGNANT NEOPLASM OF BREAST: ICD-10-CM

## 2024-12-09 DIAGNOSIS — Z00.00 ANNUAL PHYSICAL EXAM: ICD-10-CM

## 2024-12-09 DIAGNOSIS — R39.15 URGENCY OF URINATION: ICD-10-CM

## 2024-12-09 DIAGNOSIS — E55.9 VITAMIN D DEFICIENCY: ICD-10-CM

## 2024-12-09 DIAGNOSIS — H25.13 AGE-RELATED NUCLEAR CATARACT OF BOTH EYES: ICD-10-CM

## 2024-12-09 DIAGNOSIS — M72.2 PLANTAR FASCIITIS, BILATERAL: ICD-10-CM

## 2024-12-09 DIAGNOSIS — G47.00 INSOMNIA, UNSPECIFIED TYPE: ICD-10-CM

## 2024-12-09 DIAGNOSIS — M21.6X1 PRONATION DEFORMITY OF BOTH FEET: ICD-10-CM

## 2024-12-09 DIAGNOSIS — M79.89 LEG SWELLING: ICD-10-CM

## 2024-12-09 DIAGNOSIS — R13.13 PHARYNGEAL DYSPHAGIA: Primary | ICD-10-CM

## 2024-12-09 DIAGNOSIS — H52.13 MYOPIA, BILATERAL: ICD-10-CM

## 2024-12-09 DIAGNOSIS — M21.6X2 PRONATION DEFORMITY OF BOTH FEET: ICD-10-CM

## 2024-12-09 DIAGNOSIS — Z78.0 MENOPAUSE: ICD-10-CM

## 2024-12-09 DIAGNOSIS — L71.9 ROSACEA: ICD-10-CM

## 2024-12-09 DIAGNOSIS — E53.9 VITAMIN B DEFICIENCY: ICD-10-CM

## 2024-12-09 DIAGNOSIS — M76.31 ILIOTIBIAL BAND SYNDROME OF RIGHT SIDE: ICD-10-CM

## 2024-12-09 PROBLEM — R42 LIGHTHEADEDNESS: Status: RESOLVED | Noted: 2023-10-30 | Resolved: 2024-12-09

## 2024-12-09 NOTE — PROGRESS NOTES
HPI:    Patient ID: Candelaria Cuellar is a 69 year old female.    HPI      Immunization History   Administered Date(s) Administered    Covid-19 Vaccine Moderna 100 mcg/0.5 ml 02/11/2021, 03/11/2021, 11/02/2021    Covid-19 Vaccine Moderna 50 Mcg/0.25 Ml 06/06/2022    Covid-19 Vaccine Moderna Bivalent 50mcg/0.5mL 12+ years 11/28/2022    FLU VAC High Dose 65 YRS & Older PRSV Free (78340) 10/25/2022    FLUZONE 6 months and older PFS 0.5 ml (37192) 09/30/2018    Flucelvax 0.5 Ml Quad PFS Single Dose 09/26/2019    Influenza 09/07/2016, 09/26/2019, 10/09/2020, 10/01/2021    Influenza(Afluria)0.5ml QIV PFS 09/08/2020    Pfizer Covid-19 Vaccine 30mcg/0.3ml 12yrs+ 11/12/2023    Pneumococcal Conjugate PCV20 11/17/2022    Pneumovax 23 11/15/2021    TDAP 08/20/2018    Zoster Vaccine Recombinant Adjuvanted (Shingrix) 04/04/2019, 06/12/2019       Past Medical History:    Abnormal mammogram    \"abn MMG\", Left breast bx-benign    History of bone density study    NL DEXA    Injury of ligament of hand    ruptured ligament in right thumb, surgery 2012    Lipid screening    per NG    Neuroma of foot    right foot, steroid injection with releif of sx    Osteoarthritis    Left knee    Osteoporosis    Actonel    Osteoporosis screening    per NG    Rosacea    meds    UTI (urinary tract infection)      Past Surgical History:   Procedure Laterality Date    Breast biopsy Left     benign    Breast biopsy Right     2002    Cholecystectomy  2004    Colonoscopy  11/2011    Colonoscopy  11/14/2011    per NG    Colonoscopy N/A 4/19/2022    Procedure: COLONOSCOPY;  Surgeon: Aftab Rosado MD;  Location: [facility] ENDOSCOPY    Hand/finger surgery unlisted  2012    surgery (ruptured ligament in right thumb)    Hysterectomy  2000    Maciej biopsy stereo nodule 1 site left (cpt=19081)      2001    Maciej biopsy stereo nodule 1 site right (cpt=19081)      2001    Maciej biopsy stereo nodule 2 site bilat (cpt=19081/70253)      2003    Other surgical history  10/2004     \"GALLBLADDER SURGERY\"    Total abdom hysterectomy  9/2000    TOBIAS/BSO      Social History     Socioeconomic History    Marital status: Single   Occupational History    Occupation: Dentist   Tobacco Use    Smoking status: Former    Smokeless tobacco: Never    Tobacco comments:     A few cigarettes a few days/ week   Vaping Use    Vaping status: Never Used   Substance and Sexual Activity    Alcohol use: Yes     Alcohol/week: 8.0 standard drinks of alcohol     Types: 6 Glasses of wine, 2 Cans of beer per week     Comment: social    Drug use: No    Sexual activity: Not Currently     Birth control/protection: Hysterectomy   Other Topics Concern    Grew up on a farm No    History of tanning No    Outdoor occupation No    Pt has a pacemaker No    Pt has a defibrillator No    Breast feeding No    Reaction to local anesthetic No    Caffeine Concern Yes     Comment: 2 cups coffee daily    Exercise Yes          Review of Systems           Current Outpatient Medications   Medication Sig Dispense Refill    Magnesium 125 MG Oral Cap       aspirin (ASPIR-LOW) 81 MG Oral Tab EC       Calcium-Magnesium-Vitamin D (CALCIUM 1200+D3 OR)       ibuprofen 400 MG Oral Tab       Doxycycline Hyclate 100 MG Oral Cap Take po qd (Patient not taking: Reported on 3/15/2024) 90 capsule 3    Omega-3 Fatty Acids (RA FISH OIL) 1000 MG Oral Cap Take  by mouth. Fish Oil 120 mg-180 mg capsule      Flaxseed, Linseed, (RA FLAX SEED OIL 1000) 1000 MG Oral Cap Take  by mouth. flaxseed oil 1,000 mg capsule      Multiple Vitamins-Minerals (MULTIVITAMIN OR) Take  by mouth. Take one tablet by mouth daily      polypropylene glycol- 0.4-0.3 % Ophthalmic Solution Apply  to eye. Use one drop each eye PRN.      vitamin E 400 UNITS Oral Cap Take  by mouth. vitamin E 400 unit capsule       Allergies:Allergies[1]   PHYSICAL EXAM:   Physical Exam  There were no vitals taken for this visit.  Wt Readings from Last 2 Encounters:   11/22/23 168 lb (76.2 kg)    10/30/23 170 lb (77.1 kg)     There is no height or weight on file to calculate BMI.(2)  Lab Results   Component Value Date    WBC 4.3 11/18/2023    RBC 4.30 11/18/2023    HGB 13.8 11/18/2023    HCT 41.0 11/18/2023    MCV 95.3 11/18/2023    MCH 32.1 11/18/2023    MCHC 33.7 11/18/2023    RDW 13.1 11/18/2023    .0 11/17/2022    MPV 8.9 08/20/2018      Lab Results   Component Value Date    GLU 88 12/02/2024    BUN 17 12/02/2024    BUNCREA 20.5 (H) 12/02/2024    CREATSERUM 0.83 12/02/2024    ANIONGAP 5 12/02/2024    GFRNAA 72 11/15/2021    GFRAA 83 11/15/2021    CA 10.0 12/02/2024    OSMOCALC 291 12/02/2024    ALKPHO 97 12/02/2024    AST 21 12/02/2024    ALT 17 12/02/2024    ALKPHOS 66 07/25/2016    BILT 0.6 12/02/2024    TP 7.3 12/02/2024    ALB 4.6 12/02/2024    GLOBULIN 2.7 12/02/2024    AGRATIO 1.6 11/18/2023     12/02/2024    K 4.4 12/02/2024     12/02/2024    CO2 27.0 12/02/2024      No results found for: \"EAG\", \"A1C\"   Lab Results   Component Value Date    CHOLEST 164 12/02/2024    TRIG 71 12/02/2024    HDL 67 (H) 12/02/2024    LDL 83 12/02/2024    VLDL 11 12/02/2024    NONHDLC 97 12/02/2024    CALCNONHDL 142 (H) 07/25/2016      Lab Results   Component Value Date    T4F 0.9 11/29/2023    TSH 6.619 (H) 11/29/2023                ASSESSMENT/PLAN:     Problem List Items Addressed This Visit    None     ***    No orders of the defined types were placed in this encounter.      Meds This Visit:  Requested Prescriptions      No prescriptions requested or ordered in this encounter       Imaging & Referrals:  None         CLIFTON Hansen        [1]   Allergies  Allergen Reactions    Vicodin  [Hydrocodone-Acetaminophen] RASH

## 2024-12-09 NOTE — PROGRESS NOTES
Subjective:   Candelaria Cuellar is a 69 year old female who presents for a Medicare Subsequent Annual Wellness visit (Pt already had Initial Annual Wellness) and scheduled follow up of multiple significant but stable problems and acute complicated new problem.       Exercise  Aqua aerobics.  Recently did a barre class.    History/Other:   Fall Risk Assessment:   She has been screened for Falls and is low risk.      Cognitive Assessment:   She had a completely normal cognitive assessment - see flowsheet entries     Functional Ability/Status:   Candelaria Cuellar has a completely normal functional assessment. See flowsheet for details.      Depression Screening (PHQ):       5 minutes spent screening and counseling for depression    Advanced Directives:   She does have a Living Will but we do NOT have it on file in Epic.    She has a Power of  for Health Care on file in Table8.  Discussed Advance Care Planning with patient (and family/surrogate if present). Standard forms made available to patient in After Visit Summary.      Patient Active Problem List   Diagnosis    Rosacea    Myopia, bilateral    Osteopenia    Age-related nuclear cataract of both eyes    Pronation deformity of both feet    Plantar fasciitis, bilateral    Primary osteoarthritis of left knee    Leg swelling    Iliotibial band syndrome of right side    Insomnia    Annual physical exam    Pharyngeal dysphagia     Allergies:  She is allergic to vicodin  [hydrocodone-acetaminophen].    Current Medications:  Outpatient Medications Marked as Taking for the 12/9/24 encounter (Office Visit) with Suzie Culp APRN   Medication Sig    Magnesium 125 MG Oral Cap     aspirin (ASPIR-LOW) 81 MG Oral Tab EC     Calcium-Magnesium-Vitamin D (CALCIUM 1200+D3 OR)     ibuprofen 400 MG Oral Tab     Doxycycline Hyclate 100 MG Oral Cap Take po qd    Omega-3 Fatty Acids (RA FISH OIL) 1000 MG Oral Cap Take  by mouth. Fish Oil 120 mg-180 mg capsule    Flaxseed, Linseed, (RA  FLAX SEED OIL 1000) 1000 MG Oral Cap Take  by mouth. flaxseed oil 1,000 mg capsule    Multiple Vitamins-Minerals (MULTIVITAMIN OR) Take  by mouth. Take one tablet by mouth daily    polypropylene glycol- 0.4-0.3 % Ophthalmic Solution Apply  to eye. Use one drop each eye PRN.    vitamin E 400 UNITS Oral Cap Take  by mouth. vitamin E 400 unit capsule       Medical History:  She  has a past medical history of Abnormal mammogram (2012), History of bone density study (08/2011), Injury of ligament of hand, Lightheadedness (10/30/2023), Lipid screening (11/15/2012), Neuroma of foot, Osteoarthritis (March 2021), Osteoporosis (10/22/2015), Osteoporosis screening (08/22/2011), Rosacea, and UTI (urinary tract infection) (July 2018).  Surgical History:  She  has a past surgical history that includes hysterectomy (2000); cholecystectomy (2004); total abdom hysterectomy (9/2000); other surgical history (10/2004); colonoscopy (11/2011); hand/finger surgery unlisted (2012); Breast biopsy (Left); colonoscopy (11/14/2011); bjorn biopsy stereo nodule 2 site bilat (cpt=19081/01927); Breast biopsy (Right); bjorn biopsy stereo nodule 1 site right (cpt=19081); bjorn biopsy stereo nodule 1 site left (cpt=19081); and colonoscopy (N/A, 4/19/2022).   Family History:  Her family history includes Alcohol and Other Disorders Associated in her brother; Cancer in her brother; Cancer (age of onset: 70) in her brother; Cataracts in her mother; Diabetes in her father; Glaucoma in her mother; Heart Disease in her father; Heart Disorder in her father; Hypertension in her father and mother; Kidney Disease in her father; Lipids in her mother; Ovarian Cancer (age of onset: 70) in her maternal aunt; Seizure Disorder in her father.  Social History:  She  reports that she has quit smoking. She has never used smokeless tobacco. She reports current alcohol use of about 8.0 standard drinks of alcohol per week. She reports that she does not use  drugs.    Tobacco:  She smoked tobacco in the past but quit greater than 12 months ago.  Social History     Tobacco Use   Smoking Status Former   Smokeless Tobacco Never   Tobacco Comments    A few cigarettes a few days/ week        CAGE Alcohol Screen:   CAGE screening score of 0 on 12/9/2024, showing low risk of alcohol abuse.      Patient Care Team:  Agustín Mir MD as PCP - General (Internal Medicine)  Anu Tam, SLP as Speech Language Pathologist (Speech-Language Pathologist)    Review of Systems   Constitutional:  Negative for chills, fatigue and fever.   HENT:  Negative for congestion, ear pain, hearing loss, sinus pain, sore throat, trouble swallowing and voice change.    Eyes:  Negative for pain and visual disturbance.   Respiratory:  Negative for cough, chest tightness, shortness of breath and wheezing.    Cardiovascular:  Negative for chest pain and leg swelling.   Gastrointestinal:  Positive for constipation. Negative for abdominal pain, diarrhea, nausea and vomiting.   Endocrine: Negative for cold intolerance and heat intolerance.   Genitourinary:  Negative for dysuria and hematuria.   Musculoskeletal:  Negative for back pain and joint swelling.   Skin:  Negative for rash.   Allergic/Immunologic: Negative for environmental allergies.   Neurological:  Negative for weakness, numbness and headaches.   Hematological:  Does not bruise/bleed easily.   Psychiatric/Behavioral:  Negative for dysphoric mood and sleep disturbance. The patient is not nervous/anxious.           Objective:   Physical Exam  General Appearance:  Alert, cooperative, no distress, appears stated age   Head:  Normocephalic, without obvious abnormality, atraumatic   Eyes:  PERRL, conjunctiva/corneas clear, EOM's intact both eyes   Ears:  Normal TM's and external ear canals, both ears   Nose: Nares normal, septum midline,mucosa normal, no drainage or sinus tenderness   Throat: Lips, mucosa, and tongue normal; teeth and gums  normal   Neck: Supple, symmetrical, trachea midline, no adenopathy;  thyroid: not enlarged, symmetric, no tenderness/mass/nodules; no carotid bruit or JVD   Back:   Symmetric, no curvature, ROM normal, no CVA tenderness   Lungs:   Clear to auscultation bilaterally, respirations unlabored   Heart:  Regular rate and rhythm, S1 and S2 normal, no murmur, rub, or gallop   Abdomen:   Soft, non-tender, bowel sounds active all four quadrants,  no masses, no organomegaly   Pelvic: Deferred   Extremities: Extremities normal, atraumatic, no cyanosis or edema   Pulses: 2+ and symmetric   Skin: Skin color, texture, turgor normal, no rashes or lesions   Lymph nodes: Cervical, supraclavicular, and axillary nodes normal   Neurologic: Normal       /60 (BP Location: Right arm, Patient Position: Sitting, Cuff Size: adult)   Pulse 66   Ht 5' 3\" (1.6 m)   Wt 168 lb (76.2 kg)   BMI 29.76 kg/m²  Estimated body mass index is 29.76 kg/m² as calculated from the following:    Height as of this encounter: 5' 3\" (1.6 m).    Weight as of this encounter: 168 lb (76.2 kg).    Medicare Hearing Assessment:   Hearing Screening    Screening Method: Questionnaire  I have a problem hearing over the telephone: No I have trouble following the conversations when two or more people are talking at the same time: No   I have trouble understanding things on the TV: No I have to strain to understand conversations: No   I have to worry about missing the telephone ring or doorbell: No I have trouble hearing conversations in a noisy background such as a crowded room or restaurant: No   I get confused about where sounds come from: No I misunderstand some words in a sentence and need to ask people to repeat themselves: No   I especially have trouble understanding the speech of women and children: No I have trouble understanding the speaker in a large room such as at a meeting or place of Protestant: No   Many people I talk to seem to mumble (or don't speak  clearly): Sometimes People get annoyed because I misunderstand what they say: No   I misunderstand what others are saying and make inappropriate responses: No I avoid social activities because I cannot hear well and fear I will reply improperly: No   Family members and friends have told me they think I may have hearing loss: No               Assessment & Plan:   Candelaria Cuellar is a 69 year old female who presents for a Medicare Assessment.     1. Pharyngeal dysphagia (Primary)  Assessment & Plan:  This is improved  2. Encounter for screening mammogram for malignant neoplasm of breast  -     [facility] KASSY 2D+3D SCREENING BILAT (CPT=77067/00553); Future; Expected date: 12/09/2024  3. Annual physical exam  Assessment & Plan:  Normal exam.  Labs as ordered.  Skin check normal.  No significant abnormal nevi.  Breast exam completed-no palpable abnormalities, discharge from the nipples or axillary adenopathy.  No cervical or inguinal lymphadenopathy.  Hernial orifices intact.  Hysterectomy (Total).  Immunizations- Up to date     Orders:  -     Comp Metabolic Panel (14); Future; Expected date: 12/09/2024  -     Lipid Panel; Future; Expected date: 12/09/2024  -     Vitamin D, 25-Hydroxy; Future; Expected date: 12/09/2024  -     Vitamin B12; Future; Expected date: 12/09/2024  -     TSH W Reflex To Free T4; Future; Expected date: 12/09/2024  -     CBC, NO DIFFERENTIAL/PLATELET; Future; Expected date: 12/09/2024  4. Vitamin D deficiency  -     Vitamin D, 25-Hydroxy; Future; Expected date: 12/09/2024  5. Vitamin B deficiency  -     Vitamin B12; Future; Expected date: 12/09/2024  6. Menopause  -     XR DEXA BONE DENSITOMETRY (CPT=77080); Future; Expected date: 12/09/2024  7. Urgency of urination  -     Urine Culture, Routine; Future; Expected date: 12/09/2024  -     Urogynecology Referral - In Network  8. Pronation deformity of both feet  Assessment & Plan:  No further complaints at this visit.     9. Primary osteoarthritis of left  knee  Assessment & Plan:  Followed with Dr Mckay  10. Plantar fasciitis, bilateral  Assessment & Plan:  No complaints at this visit  11. Osteopenia of multiple sites  Assessment & Plan:  Patient with hx of osteopenia     Calcium-Magnesium-Vitamin D (CALCIUM 1200+D3 OR)       Repeat Dexa Scan  12. Iliotibial band syndrome of right side  Assessment & Plan:  No complaints at this visit  13. Myopia, bilateral  Assessment & Plan:  Monitor yearly eye doctor  14. Age-related nuclear cataract of both eyes  Assessment & Plan:  No treatment is required. Will continue to observe.     15. Rosacea  Assessment & Plan:  Follows with Derm  16. Insomnia, unspecified type  Assessment & Plan:  Patient sleeping OK.      17. Leg swelling  Assessment & Plan:  No leg swelling at this visit       The patient indicates understanding of these issues and agrees to the plan.  Lab work ordered.  Reinforced healthy diet, lifestyle, and exercise.      No follow-ups on file.     CLIFTON Hansen, 12/9/2024     Supplementary Documentation:   General Health:  In the past six months, have you lost more than 10 pounds without trying?: (Patient-Rptd) 2 - No  Has your appetite been poor?: (Patient-Rptd) No  Type of Diet: (Patient-Rptd) Balanced  How does the patient maintain a good energy level?: (Patient-Rptd) Appropriate Exercise  How would you describe your daily physical activity?: (Patient-Rptd) Moderate  How would you describe your current health state?: (Patient-Rptd) Good  How do you maintain positive mental well-being?: (Patient-Rptd) Social Interaction;Puzzles;Games;Visiting Friends;Visiting Family  On a scale of 0 to 10, with 0 being no pain and 10 being severe pain, what is your pain level?: (Patient-Rptd) 2 - (Mild)  In the past six months, have you experienced urine leakage?: (Patient-Rptd) 0-No  At any time do you feel concerned for the safety/well-being of yourself and/or your children, in your home or elsewhere?: (Patient-Rptd)  No  Have you had any immunizations at another office such as Influenza, Hepatitis B, Tetanus, or Pneumococcal?: (Patient-Rptd) No    Health Maintenance   Topic Date Due    Annual Depression Screening  01/01/2024    COVID-19 Vaccine (7 - 2024-25 season) 09/01/2024    Influenza Vaccine (1) 10/01/2024    Annual Physical  11/22/2024    Mammogram  02/05/2025    Colorectal Cancer Screening  04/19/2029    DEXA Scan  Completed    Fall Risk Screening (Annual)  Completed    Pneumococcal Vaccine: 65+ Years  Completed    Zoster Vaccines  Completed

## 2024-12-10 NOTE — ASSESSMENT & PLAN NOTE
Patient with hx of osteopenia     Calcium-Magnesium-Vitamin D (CALCIUM 1200+D3 OR)       Repeat Dexa Scan

## 2024-12-17 ENCOUNTER — HOSPITAL ENCOUNTER (OUTPATIENT)
Dept: BONE DENSITY | Age: 69
Discharge: HOME | End: 2024-12-17
Attending: NURSE PRACTITIONER
Payer: MEDICARE

## 2024-12-17 DIAGNOSIS — Z78.0 MENOPAUSE: ICD-10-CM

## 2024-12-17 PROCEDURE — 77080 DXA BONE DENSITY AXIAL: CPT | Performed by: NURSE PRACTITIONER

## 2025-02-05 ENCOUNTER — HOSPITAL ENCOUNTER (OUTPATIENT)
Dept: MAMMOGRAPHY | Facility: HOSPITAL | Age: 70
Discharge: HOME | End: 2025-02-05
Attending: NURSE PRACTITIONER
Payer: MEDICARE

## 2025-02-05 DIAGNOSIS — Z12.31 ENCOUNTER FOR SCREENING MAMMOGRAM FOR MALIGNANT NEOPLASM OF BREAST: ICD-10-CM

## 2025-02-05 PROCEDURE — 77067 SCR MAMMO BI INCL CAD: CPT | Performed by: NURSE PRACTITIONER

## 2025-02-05 PROCEDURE — 77063 BREAST TOMOSYNTHESIS BI: CPT | Performed by: NURSE PRACTITIONER

## 2025-02-14 ENCOUNTER — OFFICE VISIT (OUTPATIENT)
Dept: UROLOGY | Facility: HOSPITAL | Age: 70
End: 2025-02-14
Attending: OBSTETRICS & GYNECOLOGY
Payer: MEDICARE

## 2025-02-14 VITALS — WEIGHT: 168 LBS | BODY MASS INDEX: 29.77 KG/M2 | RESPIRATION RATE: 18 BRPM | HEIGHT: 63 IN

## 2025-02-14 DIAGNOSIS — N81.84 PELVIC MUSCLE WASTING: ICD-10-CM

## 2025-02-14 DIAGNOSIS — R35.1 NOCTURIA: ICD-10-CM

## 2025-02-14 DIAGNOSIS — N95.2 POSTMENOPAUSAL ATROPHIC VAGINITIS: ICD-10-CM

## 2025-02-14 DIAGNOSIS — N39.41 URGE INCONTINENCE: Primary | ICD-10-CM

## 2025-02-14 LAB
BLOOD URINE: NEGATIVE
CONTROL RUN WITHIN 24 HOURS?: YES
NITRITE URINE: NEGATIVE

## 2025-02-14 PROCEDURE — 81002 URINALYSIS NONAUTO W/O SCOPE: CPT | Performed by: OBSTETRICS & GYNECOLOGY

## 2025-02-14 PROCEDURE — 87086 URINE CULTURE/COLONY COUNT: CPT | Performed by: OBSTETRICS & GYNECOLOGY

## 2025-02-14 PROCEDURE — 99212 OFFICE O/P EST SF 10 MIN: CPT

## 2025-02-14 RX ORDER — ESTRADIOL 0.1 MG/G
CREAM VAGINAL
Qty: 42 G | Refills: 3 | Status: SHIPPED | OUTPATIENT
Start: 2025-02-14

## 2025-02-14 NOTE — PROGRESS NOTES
Selma Mendoza,   2025     Referred by Suzie Culp  Pt here with self    Chief Complaint   Patient presents with    Urge Incontinence     UUI x 1 year       HPI:  Denies FRANCISCA  +UUI  Nocturia x1-2  No prolapse sx  Not sexually active, n/a dyspareunia  Constipated bowels    PRIOR TREATMENTS:    Kegels    no UTIs  No gross hematuria        Hyst for fibroids    Vitals:  Resp 18   Ht 63\"   Wt 168 lb (76.2 kg)   BMI 29.76 kg/m²      HISTORY:  Past Medical History:    Abnormal mammogram    \"abn MMG\", Left breast bx-benign    History of bone density study    NL DEXA    Injury of ligament of hand    ruptured ligament in right thumb, surgery     Lightheadedness    Lipid screening    per NG    Neuroma of foot    right foot, steroid injection with releif of sx    Osteoarthritis    Left knee    Osteoporosis    Actonel    Osteoporosis screening    per NG    Rosacea    meds    UTI (urinary tract infection)      Past Surgical History:   Procedure Laterality Date    Breast biopsy Left     benign    Breast biopsy Right         Cholecystectomy  2004    Colonoscopy  2011    Colonoscopy  2011    per NG    Colonoscopy N/A 2022    Procedure: COLONOSCOPY;  Surgeon: Aftab Rosado MD;  Location: [facility] ENDOSCOPY    Hand/finger surgery unlisted      surgery (ruptured ligament in right thumb)    Hysterectomy      Maciej biopsy stereo nodule 1 site left (cpt=19081)          Maciej biopsy stereo nodule 1 site right (cpt=19081)          Maciej biopsy stereo nodule 2 site bilat (cpt=19081/48368)          Other surgical history  10/2004    \"GALLBLADDER SURGERY\"    Total abdom hysterectomy  2000    TOBIAS/BSO      Family History   Problem Relation Age of Onset    Lipids Mother         hyperlipidemia    Hypertension Mother     Glaucoma Mother     Cataracts Mother     Diabetes Father     Seizure Disorder Father         epilepsy    Heart Disease Father     Kidney Disease Father     Heart Disorder  Father         CHF    Hypertension Father     Cancer Brother         testicular    Alcohol and Other Disorders Associated Brother         alcoholism    Cancer Brother 70        AML    Ovarian Cancer Maternal Aunt 70    Breast Cancer Neg     Prostate Cancer Neg     Pancreatic Cancer Neg       Social History     Socioeconomic History    Marital status: Single   Occupational History    Occupation: Dentist   Tobacco Use    Smoking status: Former    Smokeless tobacco: Never    Tobacco comments:     A few cigarettes a few days/ week   Vaping Use    Vaping status: Never Used   Substance and Sexual Activity    Alcohol use: Yes     Alcohol/week: 8.0 standard drinks of alcohol     Types: 6 Glasses of wine, 2 Cans of beer per week     Comment: social    Drug use: No    Sexual activity: Not Currently     Birth control/protection: Hysterectomy   Other Topics Concern    Grew up on a farm No    History of tanning No    Outdoor occupation No    Pt has a pacemaker No    Pt has a defibrillator No    Breast feeding No    Reaction to local anesthetic No    Caffeine Concern Yes     Comment: 2 cups coffee daily    Exercise Yes   Social History Narrative    The patient does not use an assistive device..      The patient does live in a home with stairs.        Allergies:  Allergies[1]    Medications:  Medications Prior to Visit[2]    Urogynecology Summary:  Urogynecology Summary  Prolapse: No  FRANCISCA: No  Urge Incontinence: Yes  Nocturia Frequency: 2 (Reports 1 to 2 times during the night.)  Frequency: 2 hours  Incomplete emptying: Yes (Reports sense of incomplete emptying and shifts to void at times.)  Constipation: Yes (Bowel regimen reviewed. Handouts provided.)  Wears pad day?: 2 (Light pads. Wet at times.)  Wears Pad Night?: 0  Currently Sexually Active: No  Avoids sexual activity due to: Other (By choice.)    Review of Systems:    A comprehensive 12 point review of systems was completed.  Pertinent positives noted in the the HPI.  No  CP  No SOB    GENERAL EXAM:  GENERAL:  Alert and Oriented, and NAD  HEENT:  Normal, no lesions  LUNGS:  Normal effort  HEART:  RRR  ABDOMEN: soft, no mass, no hernia  EXTREM:  Normal, no edema  SKIN:  Normal, no lesions    PELVIC EXAM:  Ext. Gen: no atrophy, no lesions  Urethra: some atrophy, nontender  Bladder:some fullness, nontender  Vagina: some atrophy  Cervix & Uterus: absent  Adnexa:no masses, nontender  Perineum: nontender  Anus: wnl  Rectum: defer    PELVIS FLOOR NEUROMUSCULAR FUNCTION:  Strength:  1, Unable to hold greater than 3 sec, and Recruitment  Perineal Sensation:  Normal      PELVIC SUPPORT:  [place]:  0  Ant:  0  Post:  0  CST:  negative  UVJ: not hypermobile    Pt examined with chaperone, RN    Impression/Plan:    ICD-10-CM    1. Urge incontinence  N39.41 POCT urinalysis dipstick[46940]     Urine Culture, Routine      2. Pelvic muscle wasting  N81.84       3. Nocturia  R35.1       4. Postmenopausal atrophic vaginitis  N95.2 estradiol (ESTRACE) 0.1 MG/GM Vaginal Cream          Discussion Items:   Urodynamics and cystoscopy for evaluation of LUTS  Behavioral and pharmacologic treatments for OAB  Pelvic muscle rehabilitation including pelvic floor PT  Topical estrogen therapy for treating UGA  Bowel routine/constipation regimen  Discussed dietary and behavioral modification, discussed pharmacologic and nonpharmacologic mgmt options for urinary symptoms. Discussed dietary & weight management with potential improvements in symptoms with weight loss.    Diagnostic Items:  Urine testing    Medications Discussed:  Estrace Cream  OAB meds  Fiber, miralax    Treatment Plan, Non-surgical:   RN teaching/pt education done  Estrace / Premarin cream  Daily fiber, miralax prn    Treatment Plan, Surgical:   None      Bowel management reviewed. Discussed using fiber daily w/ addition of miralax as needed    Discussed mgmt of vulvovaginal atrophy with vaginal estrogen cream. Reviewed associated benefits, risks,  alternatives, and goals. Recommend low dose twice weekly mgmt     Discussed dietary and behavioral modifications for mgmt of urinary symptoms  Discussed weight management and benefits of weight loss on urinary symptoms  Reviewed AUA/SUFU guidelines on mgmt of non-neurogenic OAB  Discussed pharmacologic and nonpharmacologic mgmt options of urinary symptoms - reviewed risks, benefits, alternatives, and goals of treatment  Discussed specific risks related to OAB meds including, but not limited to dry mouth, constipation, blurry vision, cognitive changes, and BP elevation.    Pt verbalizes understanding of all above discussed information. All questions answered. She agrees to plan    Initiate vag estrogen, improve bowels, bladder diet/drill with pelvic exercises    Return in about 8 weeks (around 4/11/2025) for if sx persist consider OAB Meds vs PFPT.    Selma Mendoza DO, FACOG, FACS      Discussion undertaken in English, info provided      The 21st Century Cures Act makes medical notes like these available to patients in the interest of transparency. However, be advised this is a medical document. It is intended as peer to peer communication. It is written in medical language and may contain abbreviations or verbiage that are unfamiliar. It may appear blunt or direct. Medical documents are intended to carry relevant information, facts as evident, and the clinical opinion of the practitioner.          [1]   Allergies  Allergen Reactions    Vicodin  [Hydrocodone-Acetaminophen] RASH   [2]   Outpatient Medications Prior to Visit   Medication Sig Dispense Refill    Rosuvastatin Calcium 10 MG Oral Capsule Sprinkle Take 10 mg by mouth daily.      Magnesium 125 MG Oral Cap       aspirin (ASPIR-LOW) 81 MG Oral Tab EC       Calcium-Magnesium-Vitamin D (CALCIUM 1200+D3 OR)       ibuprofen 400 MG Oral Tab       Doxycycline Hyclate 100 MG Oral Cap Take po qd 90 capsule 3    Omega-3 Fatty Acids (RA FISH OIL) 1000 MG Oral Cap Take   by mouth. Fish Oil 120 mg-180 mg capsule      Flaxseed, Linseed, (RA FLAX SEED OIL 1000) 1000 MG Oral Cap Take  by mouth. flaxseed oil 1,000 mg capsule      Multiple Vitamins-Minerals (MULTIVITAMIN OR) Take  by mouth. Take one tablet by mouth daily      polypropylene glycol- 0.4-0.3 % Ophthalmic Solution Apply  to eye. Use one drop each eye PRN. (Patient not taking: Reported on 2/14/2025)      vitamin E 400 UNITS Oral Cap Take  by mouth. vitamin E 400 unit capsule       No facility-administered medications prior to visit.

## 2025-04-02 ENCOUNTER — OFFICE VISIT (OUTPATIENT)
Dept: DERMATOLOGY CLINIC | Facility: CLINIC | Age: 70
End: 2025-04-02
Payer: MEDICARE

## 2025-04-02 DIAGNOSIS — D22.9 MULTIPLE NEVI: ICD-10-CM

## 2025-04-02 DIAGNOSIS — L81.4 LENTIGO: ICD-10-CM

## 2025-04-02 DIAGNOSIS — L30.9 DERMATITIS: ICD-10-CM

## 2025-04-02 DIAGNOSIS — L57.0 AK (ACTINIC KERATOSIS): Primary | ICD-10-CM

## 2025-04-02 DIAGNOSIS — L82.1 SK (SEBORRHEIC KERATOSIS): ICD-10-CM

## 2025-04-02 DIAGNOSIS — D23.9 BENIGN NEOPLASM OF SKIN, UNSPECIFIED LOCATION: ICD-10-CM

## 2025-04-02 PROCEDURE — 99213 OFFICE O/P EST LOW 20 MIN: CPT | Performed by: DERMATOLOGY

## 2025-04-02 PROCEDURE — G2211 COMPLEX E/M VISIT ADD ON: HCPCS | Performed by: DERMATOLOGY

## 2025-04-02 RX ORDER — IMIQUIMOD 12.5 MG/.25G
CREAM TOPICAL
Qty: 12 EACH | Refills: 1 | Status: SHIPPED | OUTPATIENT
Start: 2025-04-02

## 2025-04-02 NOTE — PROGRESS NOTES
The following individual(s) verbally consented to be recorded using ambient AI listening technology and understand that they can each withdraw their consent to this listening technology at any point by asking the clinician to turn off or pause the recording:    Patient name: Candelaria Cuellar

## 2025-04-07 NOTE — PROGRESS NOTES
Candelaria Cuellar is a 69 year old female.  HPI:     CC:    Chief Complaint   Patient presents with    Full Skin Exam     LOV 3/2024. Pt present for full body skin exam. Hx of Sk's and AK's.          Allergies:  Vicodin  [hydrocodone-acetaminophen]    HISTORY:    Past Medical History:    Abnormal mammogram    \"abn MMG\", Left breast bx-benign    History of bone density study    NL DEXA    Injury of ligament of hand    ruptured ligament in right thumb, surgery 2012    Lightheadedness    Lipid screening    per NG    Neuroma of foot    right foot, steroid injection with releif of sx    Osteoarthritis    Left knee    Osteoporosis    Actonel    Osteoporosis screening    per NG    Rosacea    meds    UTI (urinary tract infection)      Past Surgical History:   Procedure Laterality Date    Breast biopsy Left     benign    Breast biopsy Right     2002    Cholecystectomy  2004    Colonoscopy  11/2011    Colonoscopy  11/14/2011    per NG    Colonoscopy N/A 4/19/2022    Procedure: COLONOSCOPY;  Surgeon: Aftab Rosado MD;  Location: [facility] ENDOSCOPY    Hand/finger surgery unlisted  2012    surgery (ruptured ligament in right thumb)    Hysterectomy  2000    Maciej biopsy stereo nodule 1 site left (cpt=19081)      2001    Maciej biopsy stereo nodule 1 site right (cpt=19081)      2001    Maciej biopsy stereo nodule 2 site bilat (cpt=19081/85518)      2003    Other surgical history  10/2004    \"GALLBLADDER SURGERY\"    Total abdom hysterectomy  9/2000    TOBIAS/BSO      Family History   Problem Relation Age of Onset    Lipids Mother         hyperlipidemia    Hypertension Mother     Glaucoma Mother     Cataracts Mother     Diabetes Father     Seizure Disorder Father         epilepsy    Heart Disease Father     Kidney Disease Father     Heart Disorder Father         CHF    Hypertension Father     Cancer Brother         testicular    Alcohol and Other Disorders Associated Brother         alcoholism    Cancer Brother 70        AML    Ovarian Cancer  Maternal Aunt 70    Breast Cancer Neg     Prostate Cancer Neg     Pancreatic Cancer Neg       Social History     Socioeconomic History    Marital status: Single   Occupational History    Occupation: Dentist   Tobacco Use    Smoking status: Former    Smokeless tobacco: Never    Tobacco comments:     A few cigarettes a few days/ week   Vaping Use    Vaping status: Never Used   Substance and Sexual Activity    Alcohol use: Yes     Alcohol/week: 8.0 standard drinks of alcohol     Types: 6 Glasses of wine, 2 Cans of beer per week     Comment: social    Drug use: No    Sexual activity: Not Currently     Birth control/protection: Hysterectomy   Other Topics Concern    Grew up on a farm No    History of tanning No    Outdoor occupation No    Pt has a pacemaker No    Pt has a defibrillator No    Breast feeding No    Reaction to local anesthetic No    Caffeine Concern Yes     Comment: 2 cups coffee daily    Exercise Yes   Social History Narrative    The patient does not use an assistive device..      The patient does live in a home with stairs.        Current Outpatient Medications   Medication Sig Dispense Refill    Imiquimod 5 % External Cream Apply topically 2 times every week to  upper lip, chest for actinic keratoses x 6 weeks 12 each 1    estradiol (ESTRACE) 0.1 MG/GM Vaginal Cream Apply 1/2 gram vaginally 2 times per week. 42 g 3    Rosuvastatin Calcium 10 MG Oral Capsule Sprinkle Take 10 mg by mouth daily.      Magnesium 125 MG Oral Cap       aspirin (ASPIR-LOW) 81 MG Oral Tab EC       Calcium-Magnesium-Vitamin D (CALCIUM 1200+D3 OR)       Doxycycline Hyclate 100 MG Oral Cap Take po qd 90 capsule 3    Omega-3 Fatty Acids (RA FISH OIL) 1000 MG Oral Cap Take  by mouth. Fish Oil 120 mg-180 mg capsule      Flaxseed, Linseed, (RA FLAX SEED OIL 1000) 1000 MG Oral Cap Take  by mouth. flaxseed oil 1,000 mg capsule      Multiple Vitamins-Minerals (MULTIVITAMIN OR) Take  by mouth. Take one tablet by mouth daily       polypropylene glycol- 0.4-0.3 % Ophthalmic Solution Apply to eye. Use one drop each eye PRN.      vitamin E 400 UNITS Oral Cap Take  by mouth. vitamin E 400 unit capsule       Allergies:   Allergies   Allergen Reactions    Vicodin  [Hydrocodone-Acetaminophen] RASH       Past Medical History:    Abnormal mammogram    \"abn MMG\", Left breast bx-benign    History of bone density study    NL DEXA    Injury of ligament of hand    ruptured ligament in right thumb, surgery 2012    Lightheadedness    Lipid screening    per     Neuroma of foot    right foot, steroid injection with releif of sx    Osteoarthritis    Left knee    Osteoporosis    Actonel    Osteoporosis screening    per     Rosacea    meds    UTI (urinary tract infection)     Past Surgical History:   Procedure Laterality Date    Breast biopsy Left     benign    Breast biopsy Right     2002    Cholecystectomy  2004    Colonoscopy  11/2011    Colonoscopy  11/14/2011    per     Colonoscopy N/A 4/19/2022    Procedure: COLONOSCOPY;  Surgeon: Aftab Rosado MD;  Location: [facility] ENDOSCOPY    Hand/finger surgery unlisted  2012    surgery (ruptured ligament in right thumb)    Hysterectomy  2000    Maciej biopsy stereo nodule 1 site left (cpt=19081)      2001    Maciej biopsy stereo nodule 1 site right (cpt=19081)      2001    Maciej biopsy stereo nodule 2 site bilat (cpt=19081/07966)      2003    Other surgical history  10/2004    \"GALLBLADDER SURGERY\"    Total abdom hysterectomy  9/2000    TOBIAS/BSO     Social History     Socioeconomic History    Marital status: Single     Spouse name: Not on file    Number of children: Not on file    Years of education: Not on file    Highest education level: Not on file   Occupational History    Occupation: Dentist   Tobacco Use    Smoking status: Former    Smokeless tobacco: Never    Tobacco comments:     A few cigarettes a few days/ week   Vaping Use    Vaping status: Never Used   Substance and Sexual Activity    Alcohol use:  Yes     Alcohol/week: 8.0 standard drinks of alcohol     Types: 6 Glasses of wine, 2 Cans of beer per week     Comment: social    Drug use: No    Sexual activity: Not Currently     Birth control/protection: Hysterectomy   Other Topics Concern    Grew up on a farm No    History of tanning No    Outdoor occupation No    Pt has a pacemaker No    Pt has a defibrillator No    Breast feeding No    Reaction to local anesthetic No     Service Not Asked    Blood Transfusions Not Asked    Caffeine Concern Yes     Comment: 2 cups coffee daily    Occupational Exposure Not Asked    Hobby Hazards Not Asked    Sleep Concern Not Asked    Stress Concern Not Asked    Weight Concern Not Asked    Special Diet Not Asked    Back Care Not Asked    Exercise Yes    Bike Helmet Not Asked    Seat Belt Not Asked    Self-Exams Not Asked   Social History Narrative    The patient does not use an assistive device..      The patient does live in a home with stairs.     Social Drivers of Health     Food Insecurity: Not on file   Transportation Needs: Not on file   Stress: Not on file   Housing Stability: Not on file     Family History   Problem Relation Age of Onset    Lipids Mother         hyperlipidemia    Hypertension Mother     Glaucoma Mother     Cataracts Mother     Diabetes Father     Seizure Disorder Father         epilepsy    Heart Disease Father     Kidney Disease Father     Heart Disorder Father         CHF    Hypertension Father     Cancer Brother         testicular    Alcohol and Other Disorders Associated Brother         alcoholism    Cancer Brother 70        AML    Ovarian Cancer Maternal Aunt 70    Breast Cancer Neg     Prostate Cancer Neg     Pancreatic Cancer Neg        There were no vitals filed for this visit.    HPI:    Chief Complaint   Patient presents with    Full Skin Exam     LOV 3/2024. Pt present for full body skin exam. Hx of Sk's and AK's.      Follow-up history AK's patient concerned several lesions over the  face legs.  Careful sunscreen use noted.  Does have some protective clothing.  Past notes/ records and appropriate/relevant lab results including pathology and past body maps reviewed. Updated and new information noted in current visit.     History of actinic keratoses here for skin check no personal or family history of skin cancer    History of rosacea in the past stable on metronidazole patient presents with concerns above.    History of Present Illness  Candelaria Cuellar is a 69 year old female who presents with concerns about skin lesions and sun damage.    She has a skin lesion she describes as a 'benign keratosis' which she occasionally picks at. It has not changed over time and she attributes it to aging. She is not worried about it but finds it bothersome.    She mentions a reddish-brown patch on her upper lip, which she associates with sun damage. She describes it as a discoloration from the sun and expresses concern about the area, noting that it makes her feel like there is more sun damage lurking in the background. She has friends who have had similar issues recently.    She discusses her skincare routine, mentioning the use of retinol serum at night to help with sun damage. She uses various products including Equate cream, Olay, and CeraVe, and notes that she avoids alpha hydroxy products due to irritation. She prefers products with vitamin C, which she finds tolerable.    No new or concerning skin issues aside from the mentioned lesions.      Patient has been in their usual state of health.  History, medications, allergies reviewed as noted.      ROS:  Denies any other systemic complaints.  No new or changeing lesions other than noted above. No fevers, chills, night sweats, unusual sun sensitivity.  No other skin complaints.        History, medications, allergies reviewed as noted.       Physical Examination:     Well-developed well-nourished patient alert oriented in no acute distress.  Exam total-body  performed, including scalp, head, neck, face,nails, hair, external eyes, including conjunctival mucosa, eyelids, lips external ears, back, chest,/ breasts, axillae,  abdomen, arms, legs, palms.     Multiple light to medium brown, well marginated, uniformly pigmented, macules and papules 6 mm and less scattered on exam. pigmented lesions examined with dermoscopy benign-appearing patterns.     Waxy tannish keratotic papules scattered, cherry-red vascular papules scattered.    See map today's date for lesions noted .      Otherwise remarkable for lesions as noted on map.  See details of examination  See Assessment /Plan for additional history and physical exam also:    Assessment / plan:    No orders of the defined types were placed in this encounter.      Meds & Refills for this Visit:  Requested Prescriptions     Signed Prescriptions Disp Refills    Imiquimod 5 % External Cream 12 each 1     Sig: Apply topically 2 times every week to  upper lip, chest for actinic keratoses x 6 weeks         Encounter Diagnoses   Name Primary?    AK (actinic keratosis) Yes    SK (seborrheic keratosis)     Multiple nevi     Lentigo     Benign neoplasm of skin, unspecified location     Dermatitis        See details on map.      Remarkable for:          Patient seen for follow-up long-term monitoring, treatment of  Actinic keratoses, medication monitoring  Plan of care:  ongoing surveillance, monitoring including regular follow-up due to longer term risk of recurrence, new lesions.  See previous notes.  There is a longitudinal care relationship with me, the care plan reflects the ongoing nature of the continuous relationship of care, and the medical record indicates that there is ongoing treatment of a serious/complex medical condition which I am currently managing.  is Applicable   Physical Exam  SKIN: Benign keratosis present on the skin. Reddish brown patch on the upper lip, suggestive of actinic  damage.    Results        Assessment & Plan  Benign Keratosis  Benign keratosis, likely age-related, with no concerning changes. She occasionally picks at it.    Actinic Keratosis  Reddish-brown patch on the upper lip, indicative of actinic keratosis due to sun damage. Prefers topical chemotherapy cream over cryotherapy to avoid hypopigmentation. The cream can also address background sun damage.  - Prescribe topical chemotherapy cream for the upper lip, to be applied twice a week at night for six weeks.    Sun Damage  Sun damage on the upper lip and possibly other areas. Retinol serum at night is recommended to mitigate sun damage. Retinol products vary in concentration, with higher concentrations in night creams or deep wrinkle formulations.  - Recommend using retinol serum at night.    Follow-up  Advised to follow up in six months to reassess skin condition and treatment effectiveness.  - Schedule follow-up appointment in six months.          Multiple benign keratoses over the face chest back legs.  Reassurance given.  Continue sunscreen sun protection  Erythematous scaling keratotic papules noted at sites noted on map  Actinic Keratoses.  Precancerous nature discussed. Sun protection, sunscreen/ blocks encouraged Lesions treated with cryo- .  Biopsy if not resolved.    Right chest   continue observation left wrist, temples.  Extensive lentigines noted.  Continue sun protection regular checks.    Rosacea fairly well controlled metronidazole, doxycycline as needed.  Mild erythema over the nose medial cheeks  .Rosacea.  Meds in grid.  Skin care instructions reviewed.  Pathophysiology reviewed.  Chronic recurrent nature discussed.  Patient will let us know how they are doing over the next several weeks.  Await clinical response to above therapy.    Patient with recent dermatitis to necklace.  Resolving.  Hydrocortisone as needed    History of AK's continue careful sun protection monitoring.  More prominent SKs  shoulders upper back lower leg  Multiple benign keratoses extensive lentigines no significant changes no new suspicious lesions  Please refer to map for specific lesions.  See additional diagnoses.  Pros cons of various therapies, risks benefits discussed.Pathophysiology discussed with patient.  Therapeutic options reviewed.  See  Medications in grid.  Instructions reviewed at length.    Benign nevi, seborrheic  keratoses, cherry angiomas:  Reassurance regarding other benign skin lesions.    Monitor for new or changing lesions. Signs and symptoms of skin cancer, ABCDE's of melanoma ( additional information available at AAD.org, skincancer.org) Encourage Sunscreen (broad-spectrum, ideally mineral-based-UVA/UVB -SPF 30 or higher) use encouraged, sun protection/sun protective clothing, self exams reviewed Followup as noted RTC ---routine checkup 6 mos -one year or p.r.n.    Encounter Times   Including precharting, reviewing chart, prior notes obtaining history: 10 minutes, medical exam :10 minutes, notes on body map, plan, counseling 10minutes My total time spent caring for the patient on the day of the encounter: 30 minutes     The patient indicates understanding of these issues and agrees to the plan.  The patient is asked to return as noted in follow-up/ above.    This note was generated using Dragon voice recognition software.  Please contact me regarding any confusion resulting from errors in recognition..  Note to patient and family: The 21st Century Cures Act makes medical notes like these available to patients. However, be advised this is a medical document. It is intended as [id number] communication and monitoring of a patient's care needs. It is written in medical language and may contain abbreviations or verbiage that are unfamiliar. It may appear blunt or direct. Medical documents are intended to carry relevant information, facts as evident and the clinical opinion of the practitioner.

## 2025-04-09 ENCOUNTER — VIRTUAL PHONE E/M (OUTPATIENT)
Dept: UROLOGY | Facility: HOSPITAL | Age: 70
End: 2025-04-09
Attending: PHYSICIAN ASSISTANT
Payer: MEDICARE

## 2025-04-09 DIAGNOSIS — N81.84 PELVIC MUSCLE WASTING: ICD-10-CM

## 2025-04-09 DIAGNOSIS — N39.41 URGE INCONTINENCE: Primary | ICD-10-CM

## 2025-04-09 DIAGNOSIS — N95.2 POSTMENOPAUSAL ATROPHIC VAGINITIS: ICD-10-CM

## 2025-04-09 NOTE — PROGRESS NOTES
This visit is being conducted as a phone (audio only) visit with patient's consent.  Patient declined video visit due to technical capacity.  Patient is in a safe, private environment for televisit, provider located in the office setting.    Visit for f/u of UUI    Currently using vag estrogen 2x weekly  Doing home PF exercises  Reports +improvement in UUI sx and nocturia    Bowels improving with fiber & miralax     Denies any current signs or symptoms of UTI    Impression/Plan:    ICD-10-CM    1. Urge incontinence  N39.41       2. Postmenopausal atrophic vaginitis  N95.2       3. Pelvic muscle wasting  N81.84           Discussion Items:   Bowel management reviewed. Discussed using fiber daily w/ addition of miralax as needed.    Discussed mgmt of vulvovaginal atrophy with vaginal estrogen cream. Reviewed associated benefits, risks, alternatives, and goals. Recommend low dose 2x/week treatment.    Mentioned PFPT and OAB meds, not interested at this time    Treatment Plan:  Continue vag estrogen as prescribed  Bowel regimen  Bladder diet/drill  Pelvic floor exercises  Call with s/sx of UTI    All questions answered  She understands and agrees to plan    Return in about 10 months (around 2/9/2026) for UUI, yearly exam.    Hannah Velázquez PA-C    A total of 10 minutes were spent in discussion with the patient and/or family on this encounter in addition to time spent on chart review and documentation.    Note to patient: The 21st Century Cures Act makes medical notes like these available to patients in the interest of transparency.  However, be advised this is a medical document.  It is intended as peer to peer communication.  It is written in medical language and may contain abbreviations or verbiage that are unfamiliar.  It may appear blunt or direct.  Medical documents are intended to carry relevant information, facts as evident, and the clinical opinion of the practitioner.

## (undated) DEVICE — MEDI-VAC NON-CONDUCTIVE SUCTION TUBING 6MM X 1.8M (6FT.) L: Brand: CARDINAL HEALTH

## (undated) DEVICE — 35 ML SYRINGE REGULAR TIP: Brand: MONOJECT

## (undated) DEVICE — LINE MNTR ADLT SET O2 INTMD

## (undated) DEVICE — KIT CLEAN ENDOKIT 1.1OZ GOWNX2

## (undated) DEVICE — KIT ENDO ORCAPOD 160/180/190

## (undated) DEVICE — SNARE CAPTIFLEX MICRO-OVL OLY

## (undated) DEVICE — SNARE OPTMZ PLPCTM TRP

## (undated) NOTE — MR AVS SNAPSHOT
[facility]  [address]  [phone]               Thank you for choosing us for your health care visit with [facility].   We are glad to serve you and happy to provide you with this summar insurance company's guidelines for prior authorization for this test.  You may be held responsible for payment in full if proper authorization is not acquired.   Please contact the Patient Business Office at [phone] if you have any questions related to This list is accurate as of: 6/19/17  9:28 AM.  Always use your most recent med list.                CALCIUM 600 + D OR   Take 2 tablets by mouth daily. Doxycycline Hyclate 100 MG Tabs   Take 1 tablet (100 mg total) by mouth daily.  take 1 tablet

## (undated) NOTE — LETTER
[facility]  Administration of Anesthesia  1. Modesta Wu, or _________________________________ acting on her behalf, (Patient) (Dependent/Representative) request to receive anesthesia for my pending procedure/operation/treatment. A physician (anesthesiologist) alone or an anesthesiologist working with a nurse anesthetist may administer my anesthesia. 2. I understand that my anesthesiologist is not an employee or agent of the hospital, but is an independent medical practitioner who has been permitted to use its facilities for the care and treatment of his/her patients. 3. I acknowledge that a physician from [facility] or their designate(s), recommended anesthesia for me using her/his medical judgment. The type(s) of anesthesia I may receive include:                a) General Anesthesia, b) Spinal/Epidural Anesthesia, c) Regional Anesthesia or d) Monitored Anesthesia Care. 4. If my spinal, regional or monitored anesthesia care (local) is not satisfactory for my comfort, or if my medical condition requires, I consent to the administration of general anesthesia. 5. I am aware that the practice of anesthesiology is not an exact science and that some foreseeable risks or consequences may occur. Some common risks/consequences include sore throat and hoarseness, nausea and vomiting, muscle soreness, backache, damage to the mouth/teeth/vocal cords and eye injury. I understand that more rare but serious potential risks of anesthesia include blood pressure changes, drug reactions, cardiac arrest, brain damage, paralysis or death. These risks apply to whether I have general, spinal/epidural, regional or monitored anesthesia care. 6. OBSTETRIC PATIENTS: Specific risks/consequences of spinal/epidural anesthesia may include itching, low blood pressure, difficulty urinating, slowing of the baby's heart rate and headache.  Rare risks include infections, high spinal block, spinal bleeding, seizure, cardiac arrest and death. 7. AWARENESS: I understand that it is possible (but unlikely) to have explicit memory of events from the operating room while under general anesthesia. 8. ELECTROCONVULSIVE THERAPY PATIENTS: This consent serves for all treatments in a single course of therapy. 9. I understand that I must inform my anesthesiologist when I last ate and/or drank to minimize the risk of anesthesia. 10. If I am pregnant, or may pregnant, I understand that elective surgery should be postponed until after the baby is born. Anesthetics cross the placenta and may temporarily anesthetize the baby. Although fetal complications of anesthesia during pregnancy are rare, they may include birth defects, premature labor, brain damage and death. 11. I certify that I informed the anesthesiologist, to the best of my ability, about medication I take including blood thinners, anticoagulants, herbal remedies, narcotics and recreational drugs (e.g. cocaine, marijuana, PCP). Failure to inform my anesthesiologist about these medicines may increase my risk of anesthetic complications. The nature and purpose of my anesthetic management was explained to me. I had the opportunity to ask questions and the answers and information provided meet my satisfaction.   I retain the right to withdraw this consent at any time prior to the administration of said anesthetic.    ___________________________________________________           _____________________________________________________  Patient Signature                                                                                      Witness Signature                ___________________________________________________           _____________________________________________________  Date/Time                                                                                               Responsible person in case of minor/ unconscious pt /Relationship    My signature below affirms that prior to the time of the procedure, I have explained to the patient and/or his/her guardian, the risks and benefits of undergoing anesthesia, as well as any reasonable alternatives.     ___________________________________________________            _____________________________________________________  Physician Signature                            Date/Time  Patient Name: Anam Monday     : 1955     Printed: 4/15/2022      Medical Record #: [id number]                              Page 1 of 1    ----------ANESTHESIA CONSENT----------

## (undated) NOTE — LETTER
[address], [facility]   Date:   3/29/2022     Name:   Clare Sandoval    YOB: 1955   MRN:   [id number]       [facility]? Vishal the areas on your body where you feel the described sensations. Use the appropriate symbol. Jaiden Danker the areas of radiation. Include all affected areas. Just to complete the picture, please draw in the face. ACHE:  ^ ^ ^   NUMBNESS:  0000   PINS & NEEDLES:  = = = =                              ^ ^ ^                       0000              = = = =                                    ^ ^ ^                       0000            = = = =      BURNING:  XXXX   STABBING: ////                  XXXX                ////                         XXXX          ////     Please vishal the line below indicating your degree of pain right now  with 0 being no pain 10 being the worst pain possible.                                          0             1             2              3             4              5              6              7             8             9             10         Patient Signature:

## (undated) NOTE — LETTER
[facility] [address], [facility]   Date:   4/25/2023     Name:   Navi Medina    YOB: 1955   MRN:   [id number]       [facility]? Titi the areas on your body where you feel the described sensations. Use the appropriate symbol. Pepper Merino the areas of radiation. Include all affected areas. Just to complete the picture, please draw in the face. ACHE:  ^ ^ ^   NUMBNESS:  0000   PINS & NEEDLES:  = = = =                              ^ ^ ^                       0000              = = = =                                    ^ ^ ^                       0000            = = = =      BURNING:  XXXX   STABBING: ////                  XXXX                ////                         XXXX          ////     Please titi the line below indicating your degree of pain right now  with 0 being no pain 10 being the worst pain possible.                                          0             1             2              3             4              5              6              7             8             9             10         Patient Signature:

## (undated) NOTE — LETTER
[address]   Date:   8/13/2021     Name:   Merlin Cullens    YOB: 1955   MRN:   [id number]       [facility]? Vishal the areas on your body where you feel the described sensations.   Use the appropriate sy

## (undated) NOTE — LETTER
[address], [place]  Authorization for Invasive Procedures  1. I hereby authorize Dr. Jose Alfredo Bowling , my physician and whomever may be designated as the doctor's assistant, to perform the following operation and/or procedure:  Colonoscopy on Geovanny Fossa at [facility].    2. My physician has explained to me the nature and purpose of the operation or other procedure, possible alternative methods of treatment, the risks involved and the possibility of complications to me. I understand the probable consequences of declining the recommended procedure and the alternative methods of treatment. I acknowledge that no guarantee has been made as to the result that may be obtained. 3. I recognize that during the course of this operation or other procedure, unforeseen conditions may necessitate additional or different procedures than those listed above. I, therefore, further authorize and request that the above-named physician, his/her physician assistants, or designees perform such procedures as are, in his/her professional opinion, necessary and desirable. If I have a Do Not Attempt Resuscitation (DNAR) order in place, that status will be suspended while in the operating room, procedural suite, and during the recovery period unless otherwise explicitly stated by me (or a person authorized to consent on my behalf). The surgeon or my attending physician will determine when the applicable recovery period ends for purposes of reinstating the DNAR order. 4. Should the need arise during my operation or immediate post-operative period; I also consent to the administration of blood and/or blood products.  Further, I understand that despite careful testing and screening of blood and blood products, I may still be subject to ill effects as a result of recieving a blood transfusion an/or blood producst. The following are some, but not all, of the potential risks that can occur: fever and allergic reactions, hemolytic reactions, transmission of disease such as hepatitis, AIDS, cytomegalovirus (CMV), and flluid overload. In the event that I wish to have autologous transfusions of my own blood, or a directed donor transfusion, I will discuss this with my physician. 5. I consent to the photographing of the operations or procedures to be performed for the purposes of advancing medicine, science, and/or education, provided my identity is not revealed. If the procedure has been videotaped, the physician/surgeon will obtain the original videotape. The hospital will not be responsible for storage or maintenance of this tape. 6. I consent to the presence of a  or observer as deemed necessary by my physician or his designee. 7. Any tissues or organs removed in the operation or other procedure may be disposed of by and at the discretion of [facility].    8. I understand that the physician and his/her physician assistants may not be employees or agents of [facility], [facility], [facility], but are independent medical practitioners who have been permitted to use its facilities for the care and treatment of their patients. 9. Patients having a sterilization procedure: I understand that if the procedure is successful the results will be permanent and it will therefore be impossible for me to inseminate, conceive or bear children. I also understand that the procedure is intended to result in sterility, although the result has not been guaranteed. 10. I CERTIFY THAT I HAVE READ AND FULLY UNDERSTAND THE ABOVE CONSENT TO OPERATION and/or OTHER PROCEDURE. 11. I acknowledge that my physician has explained sedation/analgesia administration to me including the risks and benefits. I consent to the administration of sedation/analgesia as may be necessary or desirable in the judgment of my physician.      Signature of Patient:  ________________________________________________ Date: _________Time: _________    Responsible person in case of minor or unconscious: _____________________________Relationship: ____________     Witness Signature: ____________________________________________ Date: __________ Time: ___________    Statement of Physician  My signature below affirms that prior to the time of the procedure, I have explained to the patient and/or her legal representative, the risks and benefits involved in the proposed treatment and any reasonable alternative to the proposed treatment. I have also explained the risks and benefits involved in the refusal of the proposed treatment and have answered the patient's questions. If I have a significant financial interest in this procedure/surgery, I have disclosed this and had a discussion with my patient.     Signature of Physician:   ________________________________________Date: _________Time:_______ Patient Name: Virginia Mathias  : 1955   Printed: April 15, 2022    Medical Record #: [id number]

## (undated) NOTE — Clinical Note
Suzie - I saw Candelaria today with [facility]. I've recommended bowel mgmt, vag estrogen, & bladder diet/drill with pelvic exercises. I will work to manage her urinary sx. I appreciate the opportunity to participate in her care. Thanks, Selma

## (undated) NOTE — LETTER
AUTHORIZATION FOR SURGICAL OPERATION OR OTHER PROCEDURE    1. I hereby authorize Dr. Yoly Garcia and the [facility] staff assigned to my case to perform the following operation and/or procedure at the [facility]:    Left knee csi    2. My physician has explained the nature and purpose of the operation or other procedure, possible alternative methods of treatment, the risks involved, and the possibility of complication to me. I acknowledge that no guarantee has been made as to the result that may be obtained. 3.  I recognize that, during the course of this operation, or other procedure, unforseen conditions may necessitate additional or different procedure than those listed above. I, therefore, further authorize and request that the above named physician, his/her physician assistants or designees perform such procedures as are, in his/her professional opinion, necessary and desirable. 4.  Any tissue or organs removed in the operation or other procedure may be disposed of by and at the discretion of the [facility] staff and [facility]. 5.  I understand that in the event of a medical emergency, I will be transported by local paramedics to [facility] or other hospital emergency department. 6.  I certify that I have read and fully understand the above consent to operation and/or other procedure. 7.  I acknowledge that my physician has explained sedation/analgesia administration to me including the risks and benefits. I consent to the administration of sedation/analgesia as may be necessary or desirable in the judgement of my physician. Witness signature: ___________________________________________________ Date:  ______/______/_____                    Time:  ________ A. M.  P.M.        Patient Name:  Myles Pretty   [id number]  12/5/1955    Patient signature:  ___________________________________________________             Relationship to Patient:           [] Parent    Responsible person                          []  Spouse  In case of minor or                    [] Other  _____________   Incompetent name:  __________________________________________________                               (please print)      _____________      Responsible person  In case of minor or  Incompetent signature:  _______________________________________________    Statement of Physician  My signature below affirms that prior to the time of the procedure, I have explained to the patient and/or his/her guardian, the risks and benefits involved in the proposed treatment and any reasonable alternative to the proposed treatment. I have also explained the risks and benefits involved in the refusal of the proposed treatment and have answered the patient's questions.                         Date:  ______/______/_______  Provider                      Signature:  __________________________________________________________       Time:  ___________ A.M    P.M.

## (undated) NOTE — MR AVS SNAPSHOT
[facility] [place] - [place] [address] [address]  [phone]               Thank you for choosing us for your health care visit with Jose A Velez MD.  We are glad to serve you and happy to provide you with this summary of You can access your MyChart to more actively manage your health care and view more details from this visit by going to [email or web address].   If you've recently had a stay at the Hospital you can access your discharge instructions in [address] by lucinda You don’t need to join a gym. Home exercises work great.  Put more priority on exercise in your life                    Visit [facility] online at  [email or web address]

## (undated) NOTE — LETTER
[address]   Date:   7/16/2021     Name:   Louis Brown    YOB: 1955   MRN:   [id number]       [facility]? Vishal the areas on your body where you feel the described sensations.   Use the appropriate sy